# Patient Record
Sex: FEMALE | URBAN - METROPOLITAN AREA
[De-identification: names, ages, dates, MRNs, and addresses within clinical notes are randomized per-mention and may not be internally consistent; named-entity substitution may affect disease eponyms.]

---

## 2017-05-01 ENCOUNTER — HISTORICAL (OUTPATIENT)
Dept: ADMINISTRATIVE | Facility: HOSPITAL | Age: 62
End: 2017-05-01

## 2017-05-01 LAB — POC CREATININE: 0.6 MG/DL (ref 0.6–1.3)

## 2018-01-15 ENCOUNTER — HISTORICAL (OUTPATIENT)
Dept: LAB | Facility: HOSPITAL | Age: 63
End: 2018-01-15

## 2018-01-15 LAB
ALBUMIN SERPL-MCNC: 3.8 GM/DL (ref 3.4–5)
ALBUMIN/GLOB SERPL: 1.2 {RATIO}
ALP SERPL-CCNC: 100 UNIT/L (ref 38–126)
ALT SERPL-CCNC: 21 UNIT/L (ref 12–78)
AST SERPL-CCNC: 12 UNIT/L (ref 15–37)
BILIRUB SERPL-MCNC: 0.3 MG/DL (ref 0.2–1)
BILIRUBIN DIRECT+TOT PNL SERPL-MCNC: 0.1 MG/DL (ref 0–0.2)
BILIRUBIN DIRECT+TOT PNL SERPL-MCNC: 0.2 MG/DL (ref 0–0.8)
BUN SERPL-MCNC: 22 MG/DL (ref 7–18)
CALCIUM SERPL-MCNC: 8.7 MG/DL (ref 8.5–10.1)
CHLORIDE SERPL-SCNC: 107 MMOL/L (ref 98–107)
CO2 SERPL-SCNC: 22 MMOL/L (ref 21–32)
CREAT SERPL-MCNC: 0.71 MG/DL (ref 0.55–1.02)
DEPRECATED CALCIDIOL+CALCIFEROL SERPL-MC: 23.56 NG/ML (ref 30–80)
GLOBULIN SER-MCNC: 3.3 GM/DL (ref 2.4–3.5)
GLUCOSE SERPL-MCNC: 151 MG/DL (ref 74–106)
POTASSIUM SERPL-SCNC: 4.1 MMOL/L (ref 3.5–5.1)
PROT SERPL-MCNC: 7.1 GM/DL (ref 6.4–8.2)
SODIUM SERPL-SCNC: 142 MMOL/L (ref 136–145)

## 2018-02-27 ENCOUNTER — HISTORICAL (OUTPATIENT)
Dept: LAB | Facility: HOSPITAL | Age: 63
End: 2018-02-27

## 2018-02-27 LAB
BILIRUB SERPL-MCNC: NORMAL MG/DL
BLOOD URINE, POC: NORMAL
CLARITY, POC UA: NORMAL
COLOR, POC UA: NORMAL
GLUCOSE UR QL STRIP: NEGATIVE
KETONES UR QL STRIP: NEGATIVE
LEUKOCYTE EST, POC UA: NORMAL
NITRITE, POC UA: POSITIVE
PH, POC UA: 5
PROTEIN, POC: NORMAL
SPECIFIC GRAVITY, POC UA: 1.03
UROBILINOGEN, POC UA: NORMAL

## 2021-12-03 ENCOUNTER — HISTORICAL (OUTPATIENT)
Dept: ADMINISTRATIVE | Facility: HOSPITAL | Age: 66
End: 2021-12-03

## 2022-04-10 ENCOUNTER — HISTORICAL (OUTPATIENT)
Dept: ADMINISTRATIVE | Facility: HOSPITAL | Age: 67
End: 2022-04-10

## 2022-04-11 ENCOUNTER — HISTORICAL (OUTPATIENT)
Dept: ADMINISTRATIVE | Facility: HOSPITAL | Age: 67
End: 2022-04-11

## 2022-04-28 VITALS
HEIGHT: 66 IN | OXYGEN SATURATION: 98 % | SYSTOLIC BLOOD PRESSURE: 139 MMHG | BODY MASS INDEX: 38.19 KG/M2 | DIASTOLIC BLOOD PRESSURE: 84 MMHG | WEIGHT: 237.63 LBS

## 2022-04-28 VITALS
BODY MASS INDEX: 38.19 KG/M2 | SYSTOLIC BLOOD PRESSURE: 139 MMHG | DIASTOLIC BLOOD PRESSURE: 84 MMHG | OXYGEN SATURATION: 98 % | HEIGHT: 66 IN | WEIGHT: 237.63 LBS

## 2022-09-21 ENCOUNTER — HISTORICAL (OUTPATIENT)
Dept: ADMINISTRATIVE | Facility: HOSPITAL | Age: 67
End: 2022-09-21

## 2024-10-29 ENCOUNTER — HOSPITAL ENCOUNTER (INPATIENT)
Facility: HOSPITAL | Age: 69
LOS: 2 days | Discharge: HOME | DRG: 309 | End: 2024-10-31
Attending: STUDENT IN AN ORGANIZED HEALTH CARE EDUCATION/TRAINING PROGRAM | Admitting: INTERNAL MEDICINE
Payer: MEDICARE

## 2024-10-29 DIAGNOSIS — R07.9 CHEST PAIN: ICD-10-CM

## 2024-10-29 DIAGNOSIS — I48.91 ATRIAL FIBRILLATION, UNSPECIFIED TYPE: ICD-10-CM

## 2024-10-29 DIAGNOSIS — I48.91 A-FIB: ICD-10-CM

## 2024-10-29 DIAGNOSIS — R00.2 HEART PALPITATIONS: ICD-10-CM

## 2024-10-29 DIAGNOSIS — I48.91 ATRIAL FIBRILLATION WITH RVR: Primary | ICD-10-CM

## 2024-10-29 LAB
ALBUMIN SERPL-MCNC: 3.7 G/DL (ref 3.4–4.8)
ALBUMIN/GLOB SERPL: 1.2 RATIO (ref 1.1–2)
ALP SERPL-CCNC: 75 UNIT/L (ref 40–150)
ALT SERPL-CCNC: 23 UNIT/L (ref 0–55)
ANION GAP SERPL CALC-SCNC: 10 MEQ/L
APTT PPP: 27.8 SECONDS (ref 23.2–33.7)
AST SERPL-CCNC: 19 UNIT/L (ref 5–34)
BASOPHILS # BLD AUTO: 0.1 X10(3)/MCL
BASOPHILS NFR BLD AUTO: 1.2 %
BILIRUB SERPL-MCNC: 0.3 MG/DL
BUN SERPL-MCNC: 27.7 MG/DL (ref 9.8–20.1)
CALCIUM SERPL-MCNC: 9.2 MG/DL (ref 8.4–10.2)
CHLORIDE SERPL-SCNC: 107 MMOL/L (ref 98–107)
CO2 SERPL-SCNC: 24 MMOL/L (ref 23–31)
CREAT SERPL-MCNC: 1.18 MG/DL (ref 0.55–1.02)
CREAT/UREA NIT SERPL: 23
EOSINOPHIL # BLD AUTO: 0.27 X10(3)/MCL (ref 0–0.9)
EOSINOPHIL NFR BLD AUTO: 3.4 %
ERYTHROCYTE [DISTWIDTH] IN BLOOD BY AUTOMATED COUNT: 13.2 % (ref 11.5–17)
GFR SERPLBLD CREATININE-BSD FMLA CKD-EPI: 49 ML/MIN/1.73/M2
GLOBULIN SER-MCNC: 3.2 GM/DL (ref 2.4–3.5)
GLUCOSE SERPL-MCNC: 110 MG/DL (ref 82–115)
HCT VFR BLD AUTO: 43.6 % (ref 37–47)
HGB BLD-MCNC: 14.4 G/DL (ref 12–16)
IMM GRANULOCYTES # BLD AUTO: 0.02 X10(3)/MCL (ref 0–0.04)
IMM GRANULOCYTES NFR BLD AUTO: 0.2 %
INR PPP: 1
LYMPHOCYTES # BLD AUTO: 2.73 X10(3)/MCL (ref 0.6–4.6)
LYMPHOCYTES NFR BLD AUTO: 34 %
MCH RBC QN AUTO: 27.6 PG (ref 27–31)
MCHC RBC AUTO-ENTMCNC: 33 G/DL (ref 33–36)
MCV RBC AUTO: 83.7 FL (ref 80–94)
MONOCYTES # BLD AUTO: 0.58 X10(3)/MCL (ref 0.1–1.3)
MONOCYTES NFR BLD AUTO: 7.2 %
NEUTROPHILS # BLD AUTO: 4.32 X10(3)/MCL (ref 2.1–9.2)
NEUTROPHILS NFR BLD AUTO: 54 %
NRBC BLD AUTO-RTO: 0 %
PLATELET # BLD AUTO: 273 X10(3)/MCL (ref 130–400)
PMV BLD AUTO: 11.7 FL (ref 7.4–10.4)
POTASSIUM SERPL-SCNC: 3.6 MMOL/L (ref 3.5–5.1)
PROT SERPL-MCNC: 6.9 GM/DL (ref 5.8–7.6)
PROTHROMBIN TIME: 13 SECONDS (ref 12.5–14.5)
RBC # BLD AUTO: 5.21 X10(6)/MCL (ref 4.2–5.4)
SODIUM SERPL-SCNC: 141 MMOL/L (ref 136–145)
TROPONIN I SERPL-MCNC: <0.01 NG/ML (ref 0–0.04)
WBC # BLD AUTO: 8.02 X10(3)/MCL (ref 4.5–11.5)

## 2024-10-29 PROCEDURE — 84484 ASSAY OF TROPONIN QUANT: CPT | Performed by: INTERNAL MEDICINE

## 2024-10-29 PROCEDURE — 96361 HYDRATE IV INFUSION ADD-ON: CPT

## 2024-10-29 PROCEDURE — 85025 COMPLETE CBC W/AUTO DIFF WBC: CPT | Performed by: PHYSICIAN ASSISTANT

## 2024-10-29 PROCEDURE — 85610 PROTHROMBIN TIME: CPT | Performed by: PHYSICIAN ASSISTANT

## 2024-10-29 PROCEDURE — 63600175 PHARM REV CODE 636 W HCPCS: Performed by: NURSE PRACTITIONER

## 2024-10-29 PROCEDURE — 96365 THER/PROPH/DIAG IV INF INIT: CPT

## 2024-10-29 PROCEDURE — 93005 ELECTROCARDIOGRAM TRACING: CPT

## 2024-10-29 PROCEDURE — 25000003 PHARM REV CODE 250: Performed by: INTERNAL MEDICINE

## 2024-10-29 PROCEDURE — 93010 ELECTROCARDIOGRAM REPORT: CPT | Mod: ,,, | Performed by: INTERNAL MEDICINE

## 2024-10-29 PROCEDURE — 96375 TX/PRO/DX INJ NEW DRUG ADDON: CPT

## 2024-10-29 PROCEDURE — 25000003 PHARM REV CODE 250: Performed by: STUDENT IN AN ORGANIZED HEALTH CARE EDUCATION/TRAINING PROGRAM

## 2024-10-29 PROCEDURE — 25000003 PHARM REV CODE 250: Performed by: PHYSICIAN ASSISTANT

## 2024-10-29 PROCEDURE — 84484 ASSAY OF TROPONIN QUANT: CPT | Performed by: PHYSICIAN ASSISTANT

## 2024-10-29 PROCEDURE — 21400001 HC TELEMETRY ROOM

## 2024-10-29 PROCEDURE — 99285 EMERGENCY DEPT VISIT HI MDM: CPT | Mod: 25

## 2024-10-29 PROCEDURE — 11000001 HC ACUTE MED/SURG PRIVATE ROOM

## 2024-10-29 PROCEDURE — 85730 THROMBOPLASTIN TIME PARTIAL: CPT | Performed by: PHYSICIAN ASSISTANT

## 2024-10-29 PROCEDURE — 96366 THER/PROPH/DIAG IV INF ADDON: CPT

## 2024-10-29 PROCEDURE — 80053 COMPREHEN METABOLIC PANEL: CPT | Performed by: PHYSICIAN ASSISTANT

## 2024-10-29 PROCEDURE — 96372 THER/PROPH/DIAG INJ SC/IM: CPT | Performed by: NURSE PRACTITIONER

## 2024-10-29 PROCEDURE — 36415 COLL VENOUS BLD VENIPUNCTURE: CPT | Performed by: INTERNAL MEDICINE

## 2024-10-29 RX ORDER — TALC
6 POWDER (GRAM) TOPICAL NIGHTLY PRN
Status: DISCONTINUED | OUTPATIENT
Start: 2024-10-29 | End: 2024-10-31 | Stop reason: HOSPADM

## 2024-10-29 RX ORDER — DILTIAZEM HYDROCHLORIDE 5 MG/ML
10 INJECTION INTRAVENOUS
Status: COMPLETED | OUTPATIENT
Start: 2024-10-29 | End: 2024-10-29

## 2024-10-29 RX ORDER — ACETAMINOPHEN 325 MG/1
650 TABLET ORAL
Status: COMPLETED | OUTPATIENT
Start: 2024-10-29 | End: 2024-10-29

## 2024-10-29 RX ORDER — ENOXAPARIN SODIUM 150 MG/ML
1 INJECTION SUBCUTANEOUS EVERY 12 HOURS
Status: DISCONTINUED | OUTPATIENT
Start: 2024-10-29 | End: 2024-10-31 | Stop reason: HOSPADM

## 2024-10-29 RX ORDER — ACETAMINOPHEN 325 MG/1
650 TABLET ORAL EVERY 8 HOURS PRN
Status: DISCONTINUED | OUTPATIENT
Start: 2024-10-29 | End: 2024-10-31 | Stop reason: HOSPADM

## 2024-10-29 RX ORDER — SODIUM CHLORIDE 0.9 % (FLUSH) 0.9 %
10 SYRINGE (ML) INJECTION
Status: DISCONTINUED | OUTPATIENT
Start: 2024-10-29 | End: 2024-10-31 | Stop reason: HOSPADM

## 2024-10-29 RX ADMIN — DILTIAZEM HYDROCHLORIDE 2.5 MG/HR: 5 INJECTION INTRAVENOUS at 05:10

## 2024-10-29 RX ADMIN — ENOXAPARIN SODIUM 120 MG: 120 INJECTION SUBCUTANEOUS at 07:10

## 2024-10-29 RX ADMIN — DILTIAZEM HYDROCHLORIDE 10 MG: 5 INJECTION INTRAVENOUS at 05:10

## 2024-10-29 RX ADMIN — SODIUM CHLORIDE 1000 ML: 9 INJECTION, SOLUTION INTRAVENOUS at 04:10

## 2024-10-29 RX ADMIN — ACETAMINOPHEN 650 MG: 325 TABLET, FILM COATED ORAL at 08:10

## 2024-10-29 NOTE — ED PROVIDER NOTES
Encounter Date: 10/29/2024    SCRIBE #1 NOTE: I, Minerva Sosa, am scribing for, and in the presence of,  Sage Reid MD. I have scribed the following portions of the note - the EKG reading. Other sections scribed: HPI, ROS, PE.       History     Chief Complaint   Patient presents with    Palpitations     Pt ambulatory to triage and presents via POV. Presents from [facility] d/t palpitations and SOB that began last night.  EKG showed afib rvr w/ . States that she is currently experiencing SOB and chest tightness. Dr hi pt cardiologist. Hx afib with ablation over 20 yrs ago but has not had recurrence of afib since.      Patient is a 69 year old female with a reported history of atrial fibrillation and breast cancer that presents to the ED for chest pressure onset last night. She also complains of palpitations. She reports a short episode of palpitations of a couple of weeks ago. She denies syncope. She reports an ablation 20 years ago.     Cardiology: Margot Hi MD       The history is provided by the patient, medical records and a relative.     Review of patient's allergies indicates:   Allergen Reactions    Iodinated contrast media     Penicillin Rash     No past medical history on file.  No past surgical history on file.  No family history on file.     Review of Systems   Constitutional:  Negative for chills and fever.   HENT:  Negative for congestion, rhinorrhea and sore throat.    Eyes:  Negative for visual disturbance.   Respiratory:  Negative for cough and shortness of breath.    Cardiovascular:  Positive for chest pain and palpitations. Negative for leg swelling.   Gastrointestinal:  Negative for abdominal pain, nausea and vomiting.   Genitourinary:  Negative for dysuria, hematuria, vaginal bleeding and vaginal discharge.   Musculoskeletal:  Negative for joint swelling.   Skin:  Negative for rash.   Neurological:  Negative for weakness.   Psychiatric/Behavioral:  Negative for confusion.         Physical Exam     Initial Vitals [10/29/24 1612]   BP Pulse Resp Temp SpO2   (!) 152/82 (!) 118 20 98.4 °F (36.9 °C) 97 %      MAP       --         Physical Exam    Nursing note and vitals reviewed.  Constitutional: She is not diaphoretic. No distress.   HENT:   Head: Normocephalic and atraumatic.   Neck: Neck supple.   Normal range of motion.  Cardiovascular:    Tachycardia present.         No murmur heard.  Pulmonary/Chest: Breath sounds normal. No respiratory distress.   Abdominal: Abdomen is soft. She exhibits no distension. There is no abdominal tenderness.   Musculoskeletal:      Cervical back: Normal range of motion and neck supple.     Neurological: She is alert and oriented to person, place, and time. She has normal strength. No cranial nerve deficit or sensory deficit.   Skin: Skin is warm. Capillary refill takes less than 2 seconds.   Psychiatric: She has a normal mood and affect.         ED Course   Critical Care    Date/Time: 10/29/2024 4:48 PM    Performed by: Sage Reid IV, MD  Authorized by: Sage Reid IV, MD  Direct patient critical care time: 8 minutes  Additional history critical care time: 5 minutes  Ordering / reviewing critical care time: 6 minutes  Documentation critical care time: 5 minutes  Consulting other physicians critical care time: 6 minutes  Consult with family critical care time: 5 minutes  Total critical care time (exclusive of procedural time) : 35 minutes  Critical care time was exclusive of separately billable procedures and treating other patients and teaching time.  Critical care was time spent personally by me on the following activities: development of treatment plan with patient or surrogate, discussions with consultants, discussions with primary provider, interpretation of cardiac output measurements, examination of patient, evaluation of patient's response to treatment, obtaining history from patient or surrogate, ordering and performing treatments and  interventions, re-evaluation of patient's condition, review of old charts, pulse oximetry, ordering and review of radiographic studies and ordering and review of laboratory studies.        Labs Reviewed   COMPREHENSIVE METABOLIC PANEL - Abnormal       Result Value    Sodium 141      Potassium 3.6      Chloride 107      CO2 24      Glucose 110      Blood Urea Nitrogen 27.7 (*)     Creatinine 1.18 (*)     Calcium 9.2      Protein Total 6.9      Albumin 3.7      Globulin 3.2      Albumin/Globulin Ratio 1.2      Bilirubin Total 0.3      ALP 75      ALT 23      AST 19      eGFR 49      Anion Gap 10.0      BUN/Creatinine Ratio 23     CBC WITH DIFFERENTIAL - Abnormal    WBC 8.02      RBC 5.21      Hgb 14.4      Hct 43.6      MCV 83.7      MCH 27.6      MCHC 33.0      RDW 13.2      Platelet 273      MPV 11.7 (*)     Neut % 54.0      Lymph % 34.0      Mono % 7.2      Eos % 3.4      Basophil % 1.2      Lymph # 2.73      Neut # 4.32      Mono # 0.58      Eos # 0.27      Baso # 0.10      IG# 0.02      IG% 0.2      NRBC% 0.0     APTT - Normal    PTT 27.8     PROTIME-INR - Normal    PT 13.0      INR 1.0     TROPONIN I - Normal    Troponin-I <0.010     CBC W/ AUTO DIFFERENTIAL    Narrative:     The following orders were created for panel order CBC auto differential.  Procedure                               Abnormality         Status                     ---------                               -----------         ------                     CBC with Differential[[id number]]       Abnormal            Final result                 Please view results for these tests on the individual orders.     EKG Readings: (Independently Interpreted)   Initial Reading: No STEMI. Rhythm: Atrial Fibrillation. Heart Rate: 124. Ectopy: Rare PVCs. Conduction: Normal. ST Segments: Normal ST Segments. T Waves: Normal. Axis: Normal. Clinical Impression: Atrial Fibrillation with RVR   1613       Imaging Results              CT Chest Without Contrast  (Preliminary result)  Result time 10/29/24 21:06:44      Preliminary result by Home Pizarro Jr., MD (10/29/24 21:06:44)                   Narrative:    START OF REPORT:  Technique: CT Scan of the chest was performed without intravenous contrast with direct axial as well as sagittal and, coronal, reconstruction images.    Dosage Information: Automated Exposure Control was utilized 204.27 mGy.cm.    Comparison: None.    Clinical History: A fib.    Findings:  Soft Tissues: Unremarkable.  Lines and Tubes: None.  Axilla: A few surgical clips are seen in the right axilla.  Neck: The right thyroid lobe is enlarged by hypodense lesions, some with peripheral calcification.  Mediastinum: A small hiatal hernia is noted.  Heart: Mild cardiomegaly is seen. Mild coronary artery calcification is seen.  Aorta: Mild aortic calcification is seen in the thoracic aorta.  Lungs: Moderate streaky opacity is seen in the lingula consistent with scarring and or subsegmental atelectasis. No acute focal infiltrate or consolidation is seen.  Pleura: No effusions or pneumothorax are identified.  Bony Structures:  Spine: Moderate spondylolytic changes are seen in the thoracic spine.  Ribs: The ribs appear unremarkable.  Abdomen: The visualized upper abdominal organs appear unremarkable.      Impression:  1. No acute focal infiltrate or consolidation is seen.  2. No acute intrathoracic process identified. Details and other findings as discussed above.                                         X-Ray Chest 1 View (Final result)  Result time 10/29/24 17:15:54      Final result by Manish Naranjo MD (10/29/24 17:15:54)                   Impression:      Right hilar enlargement.  This may be overlapping vasculature, but lymphadenopathy is an additional consideration.  Chest CT can be pursued for further assessment.      Electronically signed by: Manish Naranjo  Date:    10/29/2024  Time:    17:15               Narrative:    EXAMINATION:  XR  CHEST 1 VIEW    CLINICAL HISTORY:  Palpitations    TECHNIQUE:  Frontal view(s) of the chest.    COMPARISON:  No relevant comparison studies available at the time of dictation.    FINDINGS:  Normal cardiac silhouette.  The lungs are mildly hypoinflated.  Asymmetric right hilar enlargement.  No significant pleural effusion or discernible pneumothorax.                                       Medications   diltiaZEM 125 mg in dextrose 5 % 125 mL IVPB (ready to mix) (titrating) (2.5 mg/hr Intravenous New Bag 10/29/24 1713)   enoxaparin injection 120 mg (120 mg Subcutaneous Given 10/29/24 1900)   sodium chloride 0.9% flush 10 mL (has no administration in time range)   melatonin tablet 6 mg (has no administration in time range)   acetaminophen tablet 650 mg (has no administration in time range)   sodium chloride 0.9% bolus 1,000 mL 1,000 mL (0 mLs Intravenous Stopped 10/29/24 1723)   diltiaZEM injection 10 mg (10 mg Intravenous Given 10/29/24 1712)   acetaminophen tablet 650 mg (650 mg Oral Given 10/29/24 2035)     Medical Decision Making  69-year-old presenting with AFib RVR had an ablation 20 years ago in his not in any issues since  In AFib RVR on arrival here  Controlled with diltiazem bolus and drip will Lovenox discussed with [facility] will admit to hospitalist    Differential diagnosis includes, but is not limited to:  Afib rvr, electrolyte derangementm, dehydration, kacey, acs, chf     Problems Addressed:  Atrial fibrillation with RVR: acute illness or injury that poses a threat to life or bodily functions    Amount and/or Complexity of Data Reviewed  Labs: ordered.  Radiology: ordered and independent interpretation performed.     Details: CXR - no obvious infiltrates, consolidations, pleural effusions, or pneumothorax.      ECG/medicine tests: ordered and independent interpretation performed.     Details: 1613. Initial Reading: No STEMI. Rhythm: Atrial Fibrillation. Heart Rate: 124. Ectopy: Rare PVCs. Conduction: Normal. ST  Segments: Normal ST Segments. T Waves: Normal. Axis: Normal. Clinical Impression: Atrial Fibrillation with RVR   Discussion of management or test interpretation with external provider(s): Hospitalist - will admit  [facility] - will consult     Risk  Prescription drug management.  Drug therapy requiring intensive monitoring for toxicity.  Decision regarding hospitalization.    Critical Care  Total time providing critical care: 35 minutes            Scribe Attestation:   Scribe #1: I performed the above scribed service and the documentation accurately describes the services I performed. I attest to the accuracy of the note.    Attending Attestation:           Physician Attestation for Scribe:  Physician Attestation Statement for Scribe #1: I, Sage Reid MD, reviewed documentation, as scribed by Minerva Swan in my presence, and it is both accurate and complete.             ED Course as of 10/29/24 2338   Tue Oct 29, 2024   1716 Paged cardiology [ED]   1755 [facility] - will consult, hospitalist admission    [AC]   1847 [facility] hospitalist will admit     [AC]      ED Course User Index  [AC] Sage Reid IV, MD  [ED] Minerva Swan                           Clinical Impression:  Final diagnoses:  [R00.2] Heart palpitations  [I48.91] Atrial fibrillation, unspecified type (Primary)  [I48.91] Atrial fibrillation with RVR          ED Disposition Condition    Admit Stable                Sage Reid IV, MD  10/29/24 1561

## 2024-10-29 NOTE — FIRST PROVIDER EVALUATION
"Medical screening examination initiated.  I have conducted a focused provider triage encounter, findings are as follows:    Brief history of present illness:  69 year female to ED heart palpitations patient ablation past presents to ED for evaluation heart palpitations.  Patient seen at walk-in clinic with AFib RVR and sent to ED for further evaluation She follows with [facility] Dr. Hi    Vitals:    10/29/24 1612   BP: (!) 152/82   Pulse: (!) 118   Resp: 20   Temp: 98.4 °F (36.9 °C)   SpO2: 97%   Weight: 113.4 kg (250 lb)   Height: 5' 6" (1.676 m)       Pertinent physical exam:  Patient is awake and alert and oriented.  Ambulatory to triage.  In no acute distress.       Brief workup plan:  labs, EKG, CXR    Preliminary workup initiated; this workup will be continued and followed by the physician or advanced practice provider that is assigned to the patient when roomed.  "

## 2024-10-30 ENCOUNTER — ANESTHESIA (OUTPATIENT)
Dept: CARDIOLOGY | Facility: HOSPITAL | Age: 69
End: 2024-10-30
Payer: MEDICARE

## 2024-10-30 ENCOUNTER — ANESTHESIA EVENT (OUTPATIENT)
Dept: CARDIOLOGY | Facility: HOSPITAL | Age: 69
End: 2024-10-30
Payer: MEDICARE

## 2024-10-30 LAB
ALBUMIN SERPL-MCNC: 3.4 G/DL (ref 3.4–4.8)
ALBUMIN/GLOB SERPL: 1.4 RATIO (ref 1.1–2)
ALP SERPL-CCNC: 66 UNIT/L (ref 40–150)
ALT SERPL-CCNC: 20 UNIT/L (ref 0–55)
ANION GAP SERPL CALC-SCNC: 9 MEQ/L
AST SERPL-CCNC: 16 UNIT/L (ref 5–34)
BASOPHILS # BLD AUTO: 0.09 X10(3)/MCL
BASOPHILS NFR BLD AUTO: 1.6 %
BILIRUB SERPL-MCNC: 0.4 MG/DL
BUN SERPL-MCNC: 22.1 MG/DL (ref 9.8–20.1)
CALCIUM SERPL-MCNC: 8.9 MG/DL (ref 8.4–10.2)
CHLORIDE SERPL-SCNC: 105 MMOL/L (ref 98–107)
CO2 SERPL-SCNC: 23 MMOL/L (ref 23–31)
CREAT SERPL-MCNC: 0.94 MG/DL (ref 0.55–1.02)
CREAT/UREA NIT SERPL: 24
EOSINOPHIL # BLD AUTO: 0.19 X10(3)/MCL (ref 0–0.9)
EOSINOPHIL NFR BLD AUTO: 3.4 %
ERYTHROCYTE [DISTWIDTH] IN BLOOD BY AUTOMATED COUNT: 13.3 % (ref 11.5–17)
GFR SERPLBLD CREATININE-BSD FMLA CKD-EPI: >60 ML/MIN/1.73/M2
GLOBULIN SER-MCNC: 2.4 GM/DL (ref 2.4–3.5)
GLUCOSE SERPL-MCNC: 110 MG/DL (ref 82–115)
HCT VFR BLD AUTO: 41 % (ref 37–47)
HGB BLD-MCNC: 13.4 G/DL (ref 12–16)
IMM GRANULOCYTES # BLD AUTO: 0.01 X10(3)/MCL (ref 0–0.04)
IMM GRANULOCYTES NFR BLD AUTO: 0.2 %
LYMPHOCYTES # BLD AUTO: 2.46 X10(3)/MCL (ref 0.6–4.6)
LYMPHOCYTES NFR BLD AUTO: 44.2 %
MAGNESIUM SERPL-MCNC: 1.8 MG/DL (ref 1.6–2.6)
MCH RBC QN AUTO: 27.2 PG (ref 27–31)
MCHC RBC AUTO-ENTMCNC: 32.7 G/DL (ref 33–36)
MCV RBC AUTO: 83.3 FL (ref 80–94)
MONOCYTES # BLD AUTO: 0.44 X10(3)/MCL (ref 0.1–1.3)
MONOCYTES NFR BLD AUTO: 7.9 %
NEUTROPHILS # BLD AUTO: 2.38 X10(3)/MCL (ref 2.1–9.2)
NEUTROPHILS NFR BLD AUTO: 42.7 %
NRBC BLD AUTO-RTO: 0 %
PHOSPHATE SERPL-MCNC: 3.8 MG/DL (ref 2.3–4.7)
PLATELET # BLD AUTO: 246 X10(3)/MCL (ref 130–400)
PMV BLD AUTO: 12.1 FL (ref 7.4–10.4)
POTASSIUM SERPL-SCNC: 4 MMOL/L (ref 3.5–5.1)
PROT SERPL-MCNC: 5.8 GM/DL (ref 5.8–7.6)
RBC # BLD AUTO: 4.92 X10(6)/MCL (ref 4.2–5.4)
SODIUM SERPL-SCNC: 137 MMOL/L (ref 136–145)
TROPONIN I SERPL-MCNC: <0.01 NG/ML (ref 0–0.04)
TROPONIN I SERPL-MCNC: <0.01 NG/ML (ref 0–0.04)
WBC # BLD AUTO: 5.57 X10(3)/MCL (ref 4.5–11.5)

## 2024-10-30 PROCEDURE — 63600175 PHARM REV CODE 636 W HCPCS: Performed by: INTERNAL MEDICINE

## 2024-10-30 PROCEDURE — 84484 ASSAY OF TROPONIN QUANT: CPT | Performed by: INTERNAL MEDICINE

## 2024-10-30 PROCEDURE — 84100 ASSAY OF PHOSPHORUS: CPT | Performed by: INTERNAL MEDICINE

## 2024-10-30 PROCEDURE — 5A2204Z RESTORATION OF CARDIAC RHYTHM, SINGLE: ICD-10-PCS | Performed by: INTERNAL MEDICINE

## 2024-10-30 PROCEDURE — 93005 ELECTROCARDIOGRAM TRACING: CPT

## 2024-10-30 PROCEDURE — 63600175 PHARM REV CODE 636 W HCPCS: Performed by: NURSE PRACTITIONER

## 2024-10-30 PROCEDURE — 21400001 HC TELEMETRY ROOM

## 2024-10-30 PROCEDURE — 63600175 PHARM REV CODE 636 W HCPCS: Performed by: NURSE ANESTHETIST, CERTIFIED REGISTERED

## 2024-10-30 PROCEDURE — 25000003 PHARM REV CODE 250: Performed by: NURSE PRACTITIONER

## 2024-10-30 PROCEDURE — 36415 COLL VENOUS BLD VENIPUNCTURE: CPT | Performed by: NURSE PRACTITIONER

## 2024-10-30 PROCEDURE — 80053 COMPREHEN METABOLIC PANEL: CPT | Performed by: NURSE PRACTITIONER

## 2024-10-30 PROCEDURE — 25000003 PHARM REV CODE 250: Performed by: INTERNAL MEDICINE

## 2024-10-30 PROCEDURE — 93010 ELECTROCARDIOGRAM REPORT: CPT | Mod: ,,, | Performed by: INTERNAL MEDICINE

## 2024-10-30 PROCEDURE — 83735 ASSAY OF MAGNESIUM: CPT | Performed by: INTERNAL MEDICINE

## 2024-10-30 PROCEDURE — 85025 COMPLETE CBC W/AUTO DIFF WBC: CPT | Performed by: NURSE PRACTITIONER

## 2024-10-30 RX ORDER — LISINOPRIL AND HYDROCHLOROTHIAZIDE 12.5; 2 MG/1; MG/1
1 TABLET ORAL DAILY
Status: ON HOLD | COMMUNITY
Start: 2024-09-06 | End: 2024-10-31 | Stop reason: HOSPADM

## 2024-10-30 RX ORDER — VERAPAMIL HCL 240 MG
240 TABLET, EXTENDED RELEASE ORAL DAILY
Status: ON HOLD | COMMUNITY
Start: 2024-10-06 | End: 2024-10-31 | Stop reason: HOSPADM

## 2024-10-30 RX ORDER — PROCHLORPERAZINE EDISYLATE 5 MG/ML
2.5 INJECTION INTRAMUSCULAR; INTRAVENOUS EVERY 4 HOURS PRN
Status: DISCONTINUED | OUTPATIENT
Start: 2024-10-30 | End: 2024-10-31 | Stop reason: HOSPADM

## 2024-10-30 RX ORDER — ALPRAZOLAM 0.5 MG/1
0.5 TABLET ORAL NIGHTLY PRN
COMMUNITY

## 2024-10-30 RX ORDER — ONDANSETRON HYDROCHLORIDE 2 MG/ML
4 INJECTION, SOLUTION INTRAVENOUS EVERY 4 HOURS PRN
Status: DISCONTINUED | OUTPATIENT
Start: 2024-10-30 | End: 2024-10-31 | Stop reason: HOSPADM

## 2024-10-30 RX ORDER — DILTIAZEM HYDROCHLORIDE 120 MG/1
120 CAPSULE, COATED, EXTENDED RELEASE ORAL DAILY
Status: DISCONTINUED | OUTPATIENT
Start: 2024-10-30 | End: 2024-10-31 | Stop reason: HOSPADM

## 2024-10-30 RX ORDER — LIDOCAINE HYDROCHLORIDE 20 MG/ML
INJECTION, SOLUTION EPIDURAL; INFILTRATION; INTRACAUDAL; PERINEURAL
Status: DISCONTINUED | OUTPATIENT
Start: 2024-10-30 | End: 2024-10-30

## 2024-10-30 RX ORDER — PROPOFOL 10 MG/ML
VIAL (ML) INTRAVENOUS CONTINUOUS PRN
Status: DISCONTINUED | OUTPATIENT
Start: 2024-10-30 | End: 2024-10-30

## 2024-10-30 RX ADMIN — ENOXAPARIN SODIUM 120 MG: 120 INJECTION SUBCUTANEOUS at 08:10

## 2024-10-30 RX ADMIN — ENOXAPARIN SODIUM 120 MG: 120 INJECTION SUBCUTANEOUS at 09:10

## 2024-10-30 RX ADMIN — DILTIAZEM HYDROCHLORIDE 120 MG: 120 CAPSULE, COATED, EXTENDED RELEASE ORAL at 11:10

## 2024-10-30 RX ADMIN — LIDOCAINE HYDROCHLORIDE 4 ML: 20 INJECTION, SOLUTION EPIDURAL; INFILTRATION; INTRACAUDAL; PERINEURAL at 09:10

## 2024-10-30 RX ADMIN — PROPOFOL 100 MCG/KG/MIN: 10 INJECTION, EMULSION INTRAVENOUS at 09:10

## 2024-10-30 RX ADMIN — ACETAMINOPHEN 650 MG: 325 TABLET, FILM COATED ORAL at 12:10

## 2024-10-30 RX ADMIN — ONDANSETRON 4 MG: 2 INJECTION INTRAMUSCULAR; INTRAVENOUS at 02:10

## 2024-10-30 NOTE — PROGRESS NOTES
[facility] Hospital Medicine Progress Note        Chief Complaint: Inpatient Follow-up     HPI:   69-year-old female with a past medical history of AFib over 10 years ago which was treated with ablation, breast cancers status post lumpectomies/XRT, in remission on Femara and additional past medical history as below presented to the ER complaining of chest palpitations over the last 24 hours with mild dyspnea.  She denied fever or chills.     She arrived to the ER afebrile mildly tachycardic in the 110s hemodynamically stable maintaining normal sats on room air.  Chest x-ray questioned nonspecific findings and therefore a CT of the thorax without contrast was obtained and showed no acute process.  Patient was placed on a diltiazem drip and Hospitalist was consulted for admission with consultation to Cardiology.    Interval Hx:  Resting comfortably in bed.  Heart rate is better controlled.  Currently in the 60s.  No chest pain or palpitations.  Remains in AFib on telemetry    Objective/physical exam:  GENERAL: awake, alert, oriented and in no acute distress, non-toxic appearing   HEENT: normocephalic atraumatic   NECK: supple   LUNGS: Clear bilaterally, no wheezing or rales, no accessory muscle use   CVS:  Irregular rate and rhythm, normal peripheral perfusion  ABD: Soft, non-tender, non-distended, bowel sounds present  EXTREMITIES: no clubbing or cyanosis  SKIN: Warm, dry.   NEURO: alert and oriented, grossly without focal deficits   PSYCHIATRIC: Cooperative    VITAL SIGNS: 24 HRS MIN & MAX LAST   Temp  Min: 98.1 °F (36.7 °C)  Max: 98.4 °F (36.9 °C) 98.2 °F (36.8 °C)   BP  Min: 103/66  Max: 159/113 127/72   Pulse  Min: 67  Max: 118  67   Resp  Min: 16  Max: 30 19   SpO2  Min: 96 %  Max: 100 % 97 %       Recent Labs   Lab 10/29/24  1628   WBC 8.02   RBC 5.21   HGB 14.4   HCT 43.6   MCV 83.7   MCH 27.6   MCHC 33.0   RDW 13.2      MPV 11.7*       Recent Labs   Lab 10/29/24  4428       K 3.6      CO2 24   BUN 27.7*   CREATININE 1.18*   CALCIUM 9.2   ALBUMIN 3.7   ALKPHOS 75   ALT 23   AST 19   BILITOT 0.3          Microbiology Results (last 7 days)       ** No results found for the last 168 hours. **             Radiology:  CT Chest Without Contrast  START OF REPORT:  Technique: CT Scan of the chest was performed without intravenous contrast with direct axial as well as sagittal and, coronal, reconstruction images.    Dosage Information: Automated Exposure Control was utilized 204.27 mGy.cm.    Comparison: None.    Clinical History: A fib.    Findings:  Soft Tissues: Unremarkable.  Lines and Tubes: None.  Axilla: A few surgical clips are seen in the right axilla.  Neck: The right thyroid lobe is enlarged by hypodense lesions, some with peripheral calcification.  Mediastinum: A small hiatal hernia is noted.  Heart: Mild cardiomegaly is seen. Mild coronary artery calcification is seen.  Aorta: Mild aortic calcification is seen in the thoracic aorta.  Lungs: Moderate streaky opacity is seen in the lingula consistent with scarring and or subsegmental atelectasis. No acute focal infiltrate or consolidation is seen.  Pleura: No effusions or pneumothorax are identified.  Bony Structures:  Spine: Moderate spondylolytic changes are seen in the thoracic spine.  Ribs: The ribs appear unremarkable.  Abdomen: The visualized upper abdominal organs appear unremarkable.    Impression:  1. No acute focal infiltrate or consolidation is seen.  2. No acute intrathoracic process identified. Details and other findings as discussed above.  X-Ray Chest 1 View  Narrative: EXAMINATION:  XR CHEST 1 VIEW    CLINICAL HISTORY:  Palpitations    TECHNIQUE:  Frontal view(s) of the chest.    COMPARISON:  No relevant comparison studies available at the time of dictation.    FINDINGS:  Normal cardiac silhouette.  The lungs are mildly hypoinflated.  Asymmetric right hilar enlargement.  No significant pleural effusion  or discernible pneumothorax.  Impression: Right hilar enlargement.  This may be overlapping vasculature, but lymphadenopathy is an additional consideration.  Chest CT can be pursued for further assessment.    Electronically signed by: Manish Naranjo  Date:    10/29/2024  Time:    17:15        Medications:  Scheduled Meds:   enoxparin  1 mg/kg Subcutaneous Q12H (treatment, non-standard time)     Continuous Infusions:   dilTIAZem  0-15 mg/hr Intravenous Continuous 2.5 mL/hr at 10/29/24 1713 2.5 mg/hr at 10/29/24 1713     PRN Meds:.  Current Facility-Administered Medications:     acetaminophen, 650 mg, Oral, Q8H PRN    melatonin, 6 mg, Oral, Nightly PRN    ondansetron, 4 mg, Intravenous, Q4H PRN    prochlorperazine, 2.5 mg, Intravenous, Q4H PRN    sodium chloride 0.9%, 10 mL, Intravenous, PRN        Assessment/Plan:   AFib with RVR  Previous AFib status post ablation      Hx:  Kidney cancer/resection, breast cancer/lumpectomy, HTN     Continue Cardizem drip.  Cardiology has been consulted will follow up the recommendations   Electrolytes stable.    Patient was had previous history of ablation.    Continue treatment dose Lovenox for now.  She was did not have anticoagulation list on home medications.    Holding verapamil and BP meds for now    Critical care diagnosis:  Arrhythmia requiring Cardizem drip  Critical care interventions: hands on evaluation, review of labs/radiographs/records and discussions with family  Critical care time spent: >32 minutes    71 minutes + prolonged care time spent = 31 minutes.  The total time spent = 102 minutes    Afternoon update:  Discussed case with Dr. Hi.  Successful cardioversion this morning.  Cardizem gtt discontinued and switched to oral.  Plan for DC tomorrow morning if remains stable.       Cedrick Flanagan MD   10/30/2024     All diagnosis and differential diagnosis have been reviewed; assessment and plan has been documented; I have personally reviewed the labs and test  results that are presently available; I have reviewed the patients medication list; I have reviewed the consulting providers response and recommendations. I have reviewed or attempted to review medical records based upon their availability    All of the patient's questions have been  addressed and answered. Patient's is agreeable to the above stated plan. I will continue to monitor closely and make adjustments to medical management as needed.  _____________________________________________________________________

## 2024-10-30 NOTE — CONSULTS
Inpatient consult to Cardiology  Consult performed by: Kayla El FNP  Consult ordered by: Rebecca Smith, AGACNP-BC  Reason for consult: Atrial Fibrillation with RVR      [facility] - 4th Floor Medical Telemetry    Cardiology  Consult Note    Patient Name: Alesha Maradiaga  MRN: [id number]  Admission Date: 10/29/2024  Hospital Length of Stay: 1 days  Code Status: Full Code   Attending Provider: Cedrick Flanagan MD   Consulting Provider: VICKIE Cruz  Primary Care Physician: No primary care provider on file.  Principal Problem:Atrial fibrillation    Patient information was obtained from patient, past medical records, and ER records.     Subjective:     Chief Complaint/Reason for Consult: Atrial Fibrillation with RVR    HPI:  Patient is a 69 y.o. patient known to [facility], Dr. Hi, with a PMH of PAF s/p Ablation > 10 years ago, HTN, Breast CA, Nephrectomy, and PSVT who presented [facility] ER on 10.29.24 with c/o palpitations over the last 24 hours associated with Mild Dyspnea.  She denied CP, N/V, fever, or diaphoresis.  Upon arrival to ER her HR was noted to be in the 110s and EKG confirmed Atrial Fibrillation with RVR.  /82 and O2 sats 97% on RA.  Laboratory values significant for BUN/Cr. 1.18 otherwise unremarkable. CXR without acute pulmonary processes.  She was given 10 mg IV Cardizem push and placed on continuous gtt at 2.5 mg/Hr with improvement in HR.  She was admitted to  and [facility] was consulted for evaluation and treatment of her Afib RVR.       PMH: PAF sp Ablation > 10 years ago, HTN, PSVT, Breast CA  PSH: Nephrectomy, Cardiac Ablation, Left Breast Lumpectomy, Tonsillectomy, Appendectomy  Family History: Mother, Brother: HTN, Type II DM.  Sister: CVA, HTN, Type II DM  Social History: Former Smoker (Quit 1975)    Previous Cardiac Diagnostics:   TTE (8.9.2023)  1. The study quality is average.   2. The left ventricle is normal in size. Global left ventricular systolic function is  normal. The left ventricular ejection fraction is 65%. The left ventricle diastolic function is impaired (Grade I) with normal left atrial pressure. Concentric left ventricular hypertrophy is present. It is mild.  3. The left atrial diameter is mildly increased.   4. Mild (1+) tricuspid regurgitation. Trace mitral regurgitation.  5. The estimated pulmonary artery systolic pressure is 37 mmHg assuming a right atrial pressure of 3 mmHg.     PET (1.27.2020)   This is an abnormal perfusion study. Study is consistent with ischemia.    Small reversible perfusion abnormality of mild intensity in the anterior region.    Perfusion imaging is suggestive of single vessel disease. Perfusion defect is in the distribution of left anterior descending artery.    This scan is suggestive of low to moderate risk for future cardiovascular events.    The left ventricular cavity is noted to be mildly enlarged on the stress studies. The stress left ventricular ejection fraction was calculated to be 69% and left ventricular global function is normal. The rest left ventricular cavity is noted to be normal. The rest left ventricular ejection fraction was calculated to be 58% and rest left ventricular global function is normal.    The study quality is average    Cardiac Calcium Score (1.20.2020)--> 2      No past medical history on file.  No past surgical history on file.  Review of patient's allergies indicates:   Allergen Reactions    Iodinated contrast media     Penicillin Rash     No current facility-administered medications on file prior to encounter.     Current Outpatient Medications on File Prior to Encounter   Medication Sig    ALPRAZolam (XANAX) 0.5 MG tablet Take 0.5 mg by mouth nightly as needed for Insomnia or Anxiety.    lisinopriL-hydrochlorothiazide (PRINZIDE,ZESTORETIC) 20-12.5 mg per tablet Take 1 tablet by mouth once daily. 1 tablet daily and one tablet every evening PRN    verapamiL (CALAN-SR) 240 MG CR tablet Take 240 mg  by mouth once daily.     Family History    None       Tobacco Use    Smoking status: Not on file    Smokeless tobacco: Not on file   Substance and Sexual Activity    Alcohol use: Not on file    Drug use: Not on file    Sexual activity: Not on file       Review of Systems   Constitutional:  Negative for appetite change and fatigue.   Respiratory:  Negative for chest tightness and shortness of breath.    Cardiovascular:  Positive for palpitations. Negative for chest pain and leg swelling.   All other systems reviewed and are negative.    Objective:     Vital Signs (Most Recent):  Temp: 98.2 °F (36.8 °C) (10/30/24 1124)  Pulse: 74 (10/30/24 1124)  Resp: 19 (10/30/24 0314)  BP: 119/74 (10/30/24 1124)  SpO2: 95 % (10/30/24 1124) Vital Signs (24h Range):  Temp:  [97.6 °F (36.4 °C)-98.4 °F (36.9 °C)] 98.2 °F (36.8 °C)  Pulse:  [] 74  Resp:  [16-30] 19  SpO2:  [95 %-100 %] 95 %  BP: (103-159)/() 119/74   Weight: 115.9 kg (255 lb 8.2 oz)  Body mass index is 41.24 kg/m².  SpO2: 95 %       Intake/Output Summary (Last 24 hours) at 10/30/2024 1223  Last data filed at 10/30/2024 0515  Gross per 24 hour   Intake 1249.86 ml   Output 500 ml   Net 749.86 ml     Lines/Drains/Airways       Peripheral Intravenous Line  Duration                  Peripheral IV - Single Lumen 10/29/24 1622 20 G Left Antecubital <1 day                  Significant Labs:   Chemistries:   Recent Labs   Lab 10/29/24  1628 10/29/24  2323 10/30/24  0712     --  137   K 3.6  --  4.0     --  105   CO2 24  --  23   BUN 27.7*  --  22.1*   CREATININE 1.18*  --  0.94   CALCIUM 9.2  --  8.9   BILITOT 0.3  --  0.4   ALKPHOS 75  --  66   ALT 23  --  20   AST 19  --  16   GLUCOSE 110  --  110   MG  --   --  1.80   PHOS  --   --  3.8   TROPONINI <0.010 <0.010 <0.010        CBC/Anemia Labs: Coags:    Recent Labs   Lab 10/29/24  1628 10/30/24  0713   WBC 8.02 5.57   HGB 14.4 13.4   HCT 43.6 41.0    246   MCV 83.7 83.3   RDW 13.2 13.3    Recent  Labs   Lab 10/29/24  1628   INR 1.0   APTT 27.8        Significant Imaging:  Imaging Results              CT Chest Without Contrast (Final result)  Result time 10/30/24 10:38:17      Final result by Galilea Linares MD (10/30/24 10:38:17)                   Impression:      No appreciable acute cardiopulmonary abnormality    Suspected area of fat necrosis at the upper inner right breast.  Correlate with clinical exam and 01/22/2024 screening mammogram.    No significant discrepancy from preliminary report.      Electronically signed by: Galilea Linares  Date:    10/30/2024  Time:    10:38               Narrative:    EXAMINATION:  CT CHEST WITHOUT CONTRAST    CLINICAL HISTORY:  a fib;    TECHNIQUE:  Helically acquired axial images, sagittal and coronal reformations were obtained from the thoracic inlet to the lung bases without the IV administration of contrast.    Automated tube current modulation, weight-based exposure dosing, and/or iterative reconstruction technique utilized to reach lowest reasonably achievable exposure rate.    DLP: 204 mGy*cm    COMPARISON:  PET-CT 01/31/2012    FINDINGS:  BASE OF NECK: Goiter    AORTA: Mild aortic atherosclerosis.    HEART: There are calcifications in the region the mitral valve annulus.    Cardiomegaly.    KRISTAL/MEDIASTINUM: No enlarged lymph nodes by size criteria.  Evaluation of hilar lymphadenopathy is limited without intravenous contrast.    AIRWAYS: Patent.    LUNGS: Clear lungs.    PLEURA: No pleural effusion or pneumothorax.    UPPER ABDOMEN: Liver is hypodense compatible with steatosis.    THORACIC SOFT TISSUES: Lobule of fat outlined by soft tissue density at the upper inner right breast measuring approximately 2.9 cm.    BONES: No acute fracture. No suspicious lytic or sclerotic lesions.                        Preliminary result by Home Pizarro Jr., MD (10/29/24 21:06:44)                   Impression:    1. No acute focal infiltrate or consolidation is  seen.  2. No acute intrathoracic process identified. Details and other findings as discussed above.               Narrative:    START OF REPORT:  Technique: CT Scan of the chest was performed without intravenous contrast with direct axial as well as sagittal and, coronal, reconstruction images.    Dosage Information: Automated Exposure Control was utilized 204.27 mGy.cm.    Comparison: None.    Clinical History: A fib.    Findings:  Soft Tissues: Unremarkable.  Lines and Tubes: None.  Axilla: A few surgical clips are seen in the right axilla.  Neck: The right thyroid lobe is enlarged by hypodense lesions, some with peripheral calcification.  Mediastinum: A small hiatal hernia is noted.  Heart: Mild cardiomegaly is seen. Mild coronary artery calcification is seen.  Aorta: Mild aortic calcification is seen in the thoracic aorta.  Lungs: Moderate streaky opacity is seen in the lingula consistent with scarring and or subsegmental atelectasis. No acute focal infiltrate or consolidation is seen.  Pleura: No effusions or pneumothorax are identified.  Bony Structures:  Spine: Moderate spondylolytic changes are seen in the thoracic spine.  Ribs: The ribs appear unremarkable.  Abdomen: The visualized upper abdominal organs appear unremarkable.                                         X-Ray Chest 1 View (Final result)  Result time 10/29/24 17:15:54      Final result by Manish Naranjo MD (10/29/24 17:15:54)                   Impression:      Right hilar enlargement.  This may be overlapping vasculature, but lymphadenopathy is an additional consideration.  Chest CT can be pursued for further assessment.      Electronically signed by: Manish Naranjo  Date:    10/29/2024  Time:    17:15               Narrative:    EXAMINATION:  XR CHEST 1 VIEW    CLINICAL HISTORY:  Palpitations    TECHNIQUE:  Frontal view(s) of the chest.    COMPARISON:  No relevant comparison studies available at the time of dictation.    FINDINGS:  Normal  cardiac silhouette.  The lungs are mildly hypoinflated.  Asymmetric right hilar enlargement.  No significant pleural effusion or discernible pneumothorax.                                    EKG:     Telemetry:  NSR s/p GIA/DCCV    Physical Exam  Constitutional:       Appearance: Normal appearance.   Cardiovascular:      Rate and Rhythm: Normal rate and regular rhythm.      Pulses: Normal pulses.      Heart sounds: Normal heart sounds.   Pulmonary:      Effort: Pulmonary effort is normal.      Breath sounds: Normal breath sounds.   Musculoskeletal:      Right lower leg: No edema.      Left lower leg: No edema.   Skin:     General: Skin is warm and dry.   Neurological:      Mental Status: She is alert and oriented to person, place, and time.   Psychiatric:         Mood and Affect: Mood normal.         Behavior: Behavior normal.       Home Medications:   No current facility-administered medications on file prior to encounter.     Current Outpatient Medications on File Prior to Encounter   Medication Sig Dispense Refill    ALPRAZolam (XANAX) 0.5 MG tablet Take 0.5 mg by mouth nightly as needed for Insomnia or Anxiety.      lisinopriL-hydrochlorothiazide (PRINZIDE,ZESTORETIC) 20-12.5 mg per tablet Take 1 tablet by mouth once daily. 1 tablet daily and one tablet every evening PRN      verapamiL (CALAN-SR) 240 MG CR tablet Take 240 mg by mouth once daily.       Current Schedule Inpatient Medications:   diltiaZEM  120 mg Oral Daily    enoxparin  1 mg/kg Subcutaneous Q12H (treatment, non-standard time)     Continuous Infusions:   dilTIAZem  0-15 mg/hr Intravenous Continuous   Stopped at 10/30/24 0800     Assessment:   Paroxysmal Atrial Fibrillation     -ChadsVasc-3     -s/p Ablation > 10-15 Years ago  HTN (Controlled)  Hx PSVT s/p RFA  Hx Nephrectomy  Hx Breast CA      Plan:   S/P Successful GIA/Cardioversion today  Continue FD Lovenox with plans to Transition to PO OAC at d/c.   Re: Stroke Risk Reduction in the setting of  Afib  D/C IV Cardizem and Start Cardizem  mg PO Daily  Keep K > 4.0 and Mg > 2.0  Give 2 gm IV MGSO4 x 1 today  Continue to monitor on telemetry   Will observe over night.  Likely D/C in am if Remains NSR  Labs in AM: BMP, Mg          Thank you for your consult.     VICKIE Cruz  Cardiology  [facility] - 4th Floor Medical Telemetry  10/30/2024

## 2024-10-30 NOTE — PLAN OF CARE
Problem: Adult Inpatient Plan of Care  Goal: Plan of Care Review  Outcome: Progressing  Goal: Patient-Specific Goal (Individualized)  Outcome: Progressing  Flowsheets (Taken 10/30/2024 6432)  Individualized Care Needs: Explaining to patient  Anxieties, Fears or Concerns: Blood thinners/ new medications  Patient/Family-Specific Goals (Include Timeframe): Informative conversations about diagnosis  Goal: Absence of Hospital-Acquired Illness or Injury  Outcome: Progressing  Goal: Optimal Comfort and Wellbeing  Outcome: Progressing  Goal: Readiness for Transition of Care  Outcome: Progressing     Problem: Bariatric Environmental Safety  Goal: Safety Maintained with Care  Outcome: Progressing     Problem: Fall Injury Risk  Goal: Absence of Fall and Fall-Related Injury  Outcome: Progressing

## 2024-10-31 VITALS
TEMPERATURE: 98 F | OXYGEN SATURATION: 98 % | SYSTOLIC BLOOD PRESSURE: 148 MMHG | HEIGHT: 66 IN | RESPIRATION RATE: 18 BRPM | WEIGHT: 255.5 LBS | HEART RATE: 58 BPM | DIASTOLIC BLOOD PRESSURE: 80 MMHG | BODY MASS INDEX: 41.06 KG/M2

## 2024-10-31 PROBLEM — I48.91 ATRIAL FIBRILLATION WITH RVR: Status: ACTIVE | Noted: 2024-10-31

## 2024-10-31 PROBLEM — Z98.890 H/O CARDIAC RADIOFREQUENCY ABLATION: Status: RESOLVED | Noted: 2024-10-31 | Resolved: 2024-10-31

## 2024-10-31 PROBLEM — Z98.890 H/O CARDIAC RADIOFREQUENCY ABLATION: Status: ACTIVE | Noted: 2024-10-31

## 2024-10-31 PROBLEM — R00.2 HEART PALPITATIONS: Status: ACTIVE | Noted: 2024-10-31

## 2024-10-31 LAB
ANION GAP SERPL CALC-SCNC: 11 MEQ/L
APICAL FOUR CHAMBER EJECTION FRACTION: 68 %
APICAL TWO CHAMBER EJECTION FRACTION: 57 %
AV INDEX (PROSTH): 0.53
AV MEAN GRADIENT: 6 MMHG
AV PEAK GRADIENT: 13 MMHG
AV VALVE AREA BY VELOCITY RATIO: 1.4 CM²
AV VALVE AREA: 1.7 CM²
AV VELOCITY RATIO: 0.44
BASOPHILS # BLD AUTO: 0.1 X10(3)/MCL
BASOPHILS NFR BLD AUTO: 1.7 %
BSA FOR ECHO PROCEDURE: 2.32 M2
BUN SERPL-MCNC: 19.7 MG/DL (ref 9.8–20.1)
CALCIUM SERPL-MCNC: 9.2 MG/DL (ref 8.4–10.2)
CHLORIDE SERPL-SCNC: 107 MMOL/L (ref 98–107)
CO2 SERPL-SCNC: 22 MMOL/L (ref 23–31)
CREAT SERPL-MCNC: 0.97 MG/DL (ref 0.55–1.02)
CREAT/UREA NIT SERPL: 20
CV ECHO LV RWT: 0.76 CM
DOP CALC AO PEAK VEL: 1.8 M/S
DOP CALC AO VTI: 31.1 CM
DOP CALC LVOT AREA: 3.1 CM2
DOP CALC LVOT DIAMETER: 2 CM
DOP CALC LVOT PEAK VEL: 0.8 M/S
DOP CALC LVOT STROKE VOLUME: 51.5 CM3
DOP CALC MV VTI: 24.4 CM
DOP CALCLVOT PEAK VEL VTI: 16.4 CM
E WAVE DECELERATION TIME: 230 MSEC
E/A RATIO: 0.98
E/E' RATIO: 10.91 M/S
ECHO LV POSTERIOR WALL: 1.4 CM (ref 0.6–1.1)
EOSINOPHIL # BLD AUTO: 0.28 X10(3)/MCL (ref 0–0.9)
EOSINOPHIL NFR BLD AUTO: 4.9 %
ERYTHROCYTE [DISTWIDTH] IN BLOOD BY AUTOMATED COUNT: 13.2 % (ref 11.5–17)
FRACTIONAL SHORTENING: 21.6 % (ref 28–44)
GFR SERPLBLD CREATININE-BSD FMLA CKD-EPI: >60 ML/MIN/1.73/M2
GLUCOSE SERPL-MCNC: 112 MG/DL (ref 82–115)
HCT VFR BLD AUTO: 39.2 % (ref 37–47)
HGB BLD-MCNC: 12.8 G/DL (ref 12–16)
IMM GRANULOCYTES # BLD AUTO: 0.01 X10(3)/MCL (ref 0–0.04)
IMM GRANULOCYTES NFR BLD AUTO: 0.2 %
INTERVENTRICULAR SEPTUM: 1.1 CM (ref 0.6–1.1)
LEFT ATRIUM SIZE: 3 CM
LEFT INTERNAL DIMENSION IN SYSTOLE: 2.9 CM (ref 2.1–4)
LEFT VENTRICLE DIASTOLIC VOLUME INDEX: 26.17 ML/M2
LEFT VENTRICLE DIASTOLIC VOLUME: 58.1 ML
LEFT VENTRICLE END DIASTOLIC VOLUME APICAL 2 CHAMBER: 94.7 ML
LEFT VENTRICLE END DIASTOLIC VOLUME APICAL 4 CHAMBER: 94.2 ML
LEFT VENTRICLE MASS INDEX: 70.6 G/M2
LEFT VENTRICLE SYSTOLIC VOLUME INDEX: 14.5 ML/M2
LEFT VENTRICLE SYSTOLIC VOLUME: 32.2 ML
LEFT VENTRICULAR INTERNAL DIMENSION IN DIASTOLE: 3.7 CM (ref 3.5–6)
LEFT VENTRICULAR MASS: 156.7 G
LV LATERAL E/E' RATIO: 8.57 M/S
LV SEPTAL E/E' RATIO: 15 M/S
LVED V (TEICH): 58.1 ML
LVES V (TEICH): 32.2 ML
LVOT MG: 1 MMHG
LVOT MV: 0.51 CM/S
LYMPHOCYTES # BLD AUTO: 2.4 X10(3)/MCL (ref 0.6–4.6)
LYMPHOCYTES NFR BLD AUTO: 41.7 %
MAGNESIUM SERPL-MCNC: 2 MG/DL (ref 1.6–2.6)
MCH RBC QN AUTO: 27.6 PG (ref 27–31)
MCHC RBC AUTO-ENTMCNC: 32.7 G/DL (ref 33–36)
MCV RBC AUTO: 84.5 FL (ref 80–94)
MONOCYTES # BLD AUTO: 0.36 X10(3)/MCL (ref 0.1–1.3)
MONOCYTES NFR BLD AUTO: 6.3 %
MV MEAN GRADIENT: 2 MMHG
MV PEAK A VEL: 0.61 M/S
MV PEAK E VEL: 0.6 M/S
MV PEAK GRADIENT: 3 MMHG
MV STENOSIS PRESSURE HALF TIME: 48 MS
MV VALVE AREA BY CONTINUITY EQUATION: 2.11 CM2
MV VALVE AREA P 1/2 METHOD: 4.58 CM2
NEUTROPHILS # BLD AUTO: 2.61 X10(3)/MCL (ref 2.1–9.2)
NEUTROPHILS NFR BLD AUTO: 45.2 %
NRBC BLD AUTO-RTO: 0 %
OHS LV EJECTION FRACTION SIMPSONS BIPLANE MOD: 61 %
OHS QRS DURATION: 132 MS
OHS QRS DURATION: 142 MS
OHS QTC CALCULATION: 492 MS
OHS QTC CALCULATION: 508 MS
PISA TR MAX VEL: 1.67 M/S
PLATELET # BLD AUTO: 249 X10(3)/MCL (ref 130–400)
PMV BLD AUTO: 12.1 FL (ref 7.4–10.4)
POTASSIUM SERPL-SCNC: 4.5 MMOL/L (ref 3.5–5.1)
PV PEAK GRADIENT: 4 MMHG
PV PEAK VELOCITY: 0.96 M/S
RBC # BLD AUTO: 4.64 X10(6)/MCL (ref 4.2–5.4)
SODIUM SERPL-SCNC: 140 MMOL/L (ref 136–145)
TDI LATERAL: 0.07 M/S
TDI SEPTAL: 0.04 M/S
TDI: 0.06 M/S
TR MAX PG: 11 MMHG
TRICUSPID ANNULAR PLANE SYSTOLIC EXCURSION: 1.85 CM
WBC # BLD AUTO: 5.76 X10(3)/MCL (ref 4.5–11.5)
Z-SCORE OF LEFT VENTRICULAR DIMENSION IN END DIASTOLE: -7.4
Z-SCORE OF LEFT VENTRICULAR DIMENSION IN END SYSTOLE: -3.83

## 2024-10-31 PROCEDURE — 63600175 PHARM REV CODE 636 W HCPCS: Performed by: NURSE PRACTITIONER

## 2024-10-31 PROCEDURE — 83735 ASSAY OF MAGNESIUM: CPT | Performed by: HOSPITALIST

## 2024-10-31 PROCEDURE — 85025 COMPLETE CBC W/AUTO DIFF WBC: CPT | Performed by: HOSPITALIST

## 2024-10-31 PROCEDURE — 25000003 PHARM REV CODE 250: Performed by: NURSE PRACTITIONER

## 2024-10-31 PROCEDURE — 36415 COLL VENOUS BLD VENIPUNCTURE: CPT | Performed by: HOSPITALIST

## 2024-10-31 PROCEDURE — 80048 BASIC METABOLIC PNL TOTAL CA: CPT | Performed by: HOSPITALIST

## 2024-10-31 RX ORDER — LISINOPRIL AND HYDROCHLOROTHIAZIDE 12.5; 2 MG/1; MG/1
1 TABLET ORAL DAILY
Qty: 30 TABLET | Refills: 11 | Status: SHIPPED | OUTPATIENT
Start: 2024-10-31 | End: 2025-10-31

## 2024-10-31 RX ORDER — DILTIAZEM HYDROCHLORIDE 120 MG/1
120 CAPSULE, COATED, EXTENDED RELEASE ORAL DAILY
Qty: 30 CAPSULE | Refills: 11 | Status: SHIPPED | OUTPATIENT
Start: 2024-10-31 | End: 2025-10-31

## 2024-10-31 RX ADMIN — DILTIAZEM HYDROCHLORIDE 120 MG: 120 CAPSULE, COATED, EXTENDED RELEASE ORAL at 09:10

## 2024-10-31 RX ADMIN — ENOXAPARIN SODIUM 120 MG: 120 INJECTION SUBCUTANEOUS at 09:10

## 2024-10-31 NOTE — DISCHARGE SUMMARY
[facility] Hospital Medicine Discharge Summary    Admit Date: 10/29/2024  Discharge Date and Time: 10/31/97339:57 AM  Admitting Physician:  Team  Discharging Physician: Anatoliy Acevedo MD.  Primary Care Physician: No primary care provider on file.  Consults: Cardiology    Discharge Diagnoses:  Paroxysmal Atrial Fibrillation     -ChadsVasc-3     -s/p Ablation > 10-15 Years ago  -presenting with afib rvr > cardizem drip>ablation >nsr>doac  HTN (Controlled)  Hx PSVT s/p RFA  Hx Nephrectomy for renal ca  Hx Breast CA/ lumpectomy/rad/femara    Hospital Course:    Patient is a 69 y.o. patient known to [facility], Dr. Gold, with a PMH of PAF s/p Ablation > 10 years ago, HTN, Breast CA w lumpectomy and Rad ,renal CA post  Nephrectomy, and PSVT who presented [facility] ER on 10.29.24 with c/o palpitations over the last 24 hours associated with Mild Dyspnea.  She denied CP, N/V, fever, or diaphoresis.  Upon arrival to ER her HR was noted to be in the 110s and EKG confirmed Atrial Fibrillation with RVR.  /82 and O2 sats 97% on RA.  Laboratory values significant for BUN/Cr. 1.18 otherwise unremarkable. CXR without acute pulmonary processes. CT thorax w/o contrast w no acute findings .  She was given 10 mg IV Cardizem push and placed on continuous gtt at 2.5 mg/Hr with improvement in HR.  She was admitted to  and [facility] was consulted for evaluation and treatment of her Afib RVR.   Pt underwent successful cardioversion  by dr gold .Will cate FD lovenox and transdition to DOAC. Pt to follow up with walt MONZON as well as [facility] as scheduled .  Pt was seen and examined on the day of discharge  Vitals:  VITAL SIGNS: 24 HRS MIN & MAX LAST   Temp  Min: 97.9 °F (36.6 °C)  Max: 98.8 °F (37.1 °C) 97.9 °F (36.6 °C)   BP  Min: 113/69  Max: 154/84 (!) 148/80   Pulse  Min: 58  Max: 79  (!) 58   Resp  Min: 18  Max: 18 18   SpO2  Min: 93 %  Max: 99 % 98 %       Physical Exam:  Constitutional:       Appearance: Normal  appearance.   Cardiovascular:      Rate and Rhythm: Normal rate and regular rhythm.      Pulses: Normal pulses.      Heart sounds: Normal heart sounds.   Pulmonary:      Effort: Pulmonary effort is normal.      Breath sounds: Normal breath sounds.   Musculoskeletal:      Right lower leg: No edema.      Left lower leg: No edema.   Skin:     General: Skin is warm and dry.   Neurological:      Mental Status: She is alert and oriented to person, place, and time.   Psychiatric:         Mood and Affect: Mood normal.         Behavior: Behavior normal.     Procedures Performed: No admission procedures for hospital encounter.     Significant Diagnostic Studies: See Full reports for all details    Recent Labs   Lab 10/29/24  1628 10/30/24  0713 10/31/24  0453   WBC 8.02 5.57 5.76   RBC 5.21 4.92 4.64   HGB 14.4 13.4 12.8   HCT 43.6 41.0 39.2   MCV 83.7 83.3 84.5   MCH 27.6 27.2 27.6   MCHC 33.0 32.7* 32.7*   RDW 13.2 13.3 13.2    246 249   MPV 11.7* 12.1* 12.1*       Recent Labs   Lab 10/29/24  1628 10/30/24  0712 10/31/24  0453    137 140   K 3.6 4.0 4.5    105 107   CO2 24 23 22*   BUN 27.7* 22.1* 19.7   CREATININE 1.18* 0.94 0.97   CALCIUM 9.2 8.9 9.2   MG  --  1.80 2.00   ALBUMIN 3.7 3.4  --    ALKPHOS 75 66  --    ALT 23 20  --    AST 19 16  --    BILITOT 0.3 0.4  --         Microbiology Results (last 7 days)       ** No results found for the last 168 hours. **             Echo    Left Ventricle: The left ventricle is normal in size. Normal wall   thickness. There is normal systolic function with a visually estimated   ejection fraction of 55 - 60%.    Right Ventricle: Right ventricular enlargement. Systolic function is   normal.    Aortic Valve: The aortic valve is a trileaflet valve. There is aortic   valve sclerosis.    MEDICATIONS FOR DISCHARGE    1- ELIQUIS 5 MGS PO BID   2- CARDIZEM  MGS PO BID    Explained in detail to the patient about the discharge plan, medications, and follow-up  visits. Pt understands and agrees with the treatment plan  Discharge Disposition:   home   Discharged Condition: stable  Diet- cardiac   Dietary Orders (From admission, onward)       Start     Ordered    10/29/24 2244  Diet Adult Regular  (Diet/Nutrition - [facility])  Diet effective now         10/29/24 2244                   Medications Per DC med rec  Activities as tolerated    For further questions contact hospitalist office    Discharge time 33 minutes    For worsening symptoms, chest pain, shortness of breath, increased abdominal pain, high grade fever, stroke or stroke like symptoms, immediately go to the nearest Emergency Room or call 911 as soon as possible.      Anatoliy Stanley M.D, on 10/31/2024. at 7:57 AM.

## 2024-10-31 NOTE — DISCHARGE INSTRUCTIONS
1.-your medication has been sent to [facility] at [place] [address] in [place].  -Eliquis 5 mg twice a day  -Cardizem  mg by mouth once a day  -record blood pressure on a daily basis and keep a log    2.-follow-up with your primary care physician  3.-follow-up with [facility]/ doctor ingraldi  as scheduled by then

## 2024-10-31 NOTE — PROGRESS NOTES
[facility]    Cardiology  Consult Note    Patient Name: Alesha Maradiaga  MRN: [id number]  Admission Date: 10/29/2024  Hospital Length of Stay: 2 days  Code Status: Full Code   Attending Provider: Anatoliy Acevedo MD   Consulting Provider: VICKIE Cruz  Primary Care Physician: No primary care provider on file.  Principal Problem:<principal problem not specified>    Patient information was obtained from patient, past medical records, and ER records.     Subjective:     Chief Complaint/Reason for Consult: Atrial Fibrillation with RVR    HPI:  Patient is a 69 y.o. patient known to [facility], Dr. Hi, with a PMH of PAF s/p Ablation > 10 years ago, HTN, Breast CA, Nephrectomy, and PSVT who presented [facility] ER on 10.29.24 with c/o palpitations over the last 24 hours associated with Mild Dyspnea.  She denied CP, N/V, fever, or diaphoresis.  Upon arrival to ER her HR was noted to be in the 110s and EKG confirmed Atrial Fibrillation with RVR.  /82 and O2 sats 97% on RA.  Laboratory values significant for BUN/Cr. 1.18 otherwise unremarkable. CXR without acute pulmonary processes.  She was given 10 mg IV Cardizem push and placed on continuous gtt at 2.5 mg/Hr with improvement in HR.  She was admitted to  and [facility] was consulted for evaluation and treatment of her Afib RVR.       PMH: PAF sp Ablation > 10 years ago, HTN, PSVT, Breast CA  PSH: Nephrectomy, Cardiac Ablation, Left Breast Lumpectomy, Tonsillectomy, Appendectomy  Family History: Mother, Brother: HTN, Type II DM.  Sister: CVA, HTN, Type II DM  Social History: Former Smoker (Quit 1975)    Previous Cardiac Diagnostics:   TTE (10.30.24)    Left Ventricle: The left ventricle is normal in size. Normal wall thickness. There is normal systolic function with a visually estimated ejection fraction of 55 - 60%.    Right Ventricle: Right ventricular enlargement. Systolic function is normal.    Aortic Valve: The aortic  valve is a trileaflet valve. There is aortic valve sclerosis.    TTE (8.9.2023)  1. The study quality is average.   2. The left ventricle is normal in size. Global left ventricular systolic function is normal. The left ventricular ejection fraction is 65%. The left ventricle diastolic function is impaired (Grade I) with normal left atrial pressure. Concentric left ventricular hypertrophy is present. It is mild.  3. The left atrial diameter is mildly increased.   4. Mild (1+) tricuspid regurgitation. Trace mitral regurgitation.  5. The estimated pulmonary artery systolic pressure is 37 mmHg assuming a right atrial pressure of 3 mmHg.     PET (1.27.2020)   This is an abnormal perfusion study. Study is consistent with ischemia.    Small reversible perfusion abnormality of mild intensity in the anterior region.    Perfusion imaging is suggestive of single vessel disease. Perfusion defect is in the distribution of left anterior descending artery.    This scan is suggestive of low to moderate risk for future cardiovascular events.    The left ventricular cavity is noted to be mildly enlarged on the stress studies. The stress left ventricular ejection fraction was calculated to be 69% and left ventricular global function is normal. The rest left ventricular cavity is noted to be normal. The rest left ventricular ejection fraction was calculated to be 58% and rest left ventricular global function is normal.    The study quality is average    Cardiac Calcium Score (1.20.2020)--> 2      No past medical history on file.  No past surgical history on file.  Review of patient's allergies indicates:   Allergen Reactions    Iodinated contrast media     Penicillin Rash     No current facility-administered medications on file prior to encounter.     Current Outpatient Medications on File Prior to Encounter   Medication Sig    ALPRAZolam (XANAX) 0.5 MG tablet Take 0.5 mg by mouth nightly as needed for Insomnia or Anxiety.     [DISCONTINUED] lisinopriL-hydrochlorothiazide (PRINZIDE,ZESTORETIC) 20-12.5 mg per tablet Take 1 tablet by mouth once daily. 1 tablet daily and one tablet every evening PRN    [DISCONTINUED] verapamiL (CALAN-SR) 240 MG CR tablet Take 240 mg by mouth once daily.     Family History    None       Tobacco Use    Smoking status: Not on file    Smokeless tobacco: Not on file   Substance and Sexual Activity    Alcohol use: Not on file    Drug use: Not on file    Sexual activity: Not on file       Review of Systems   Constitutional:  Negative for appetite change and fatigue.   Respiratory:  Negative for chest tightness and shortness of breath.    Cardiovascular:  Negative for chest pain, palpitations and leg swelling.   All other systems reviewed and are negative.    Objective:     Vital Signs (Most Recent):  Temp: 97.9 °F (36.6 °C) (10/31/24 0721)  Pulse: (!) 58 (10/31/24 0721)  Resp: 18 (10/31/24 0721)  BP: (!) 148/80 (10/31/24 0721)  SpO2: 98 % (10/31/24 0721) Vital Signs (24h Range):  Temp:  [97.9 °F (36.6 °C)-98.8 °F (37.1 °C)] 97.9 °F (36.6 °C)  Pulse:  [58-79] 58  Resp:  [18] 18  SpO2:  [93 %-99 %] 98 %  BP: (113-154)/(69-84) 148/80   Weight: 115.9 kg (255 lb 8.2 oz)  Body mass index is 41.24 kg/m².  SpO2: 98 %     No intake or output data in the 24 hours ending 10/31/24 0900    Lines/Drains/Airways       Peripheral Intravenous Line  Duration                  Peripheral IV - Single Lumen 10/29/24 1622 20 G Left Antecubital 1 day                  Significant Labs:   Chemistries:   Recent Labs   Lab 10/29/24  1628 10/29/24  2323 10/30/24  0712 10/31/24  0453     --  137 140   K 3.6  --  4.0 4.5     --  105 107   CO2 24  --  23 22*   BUN 27.7*  --  22.1* 19.7   CREATININE 1.18*  --  0.94 0.97   CALCIUM 9.2  --  8.9 9.2   BILITOT 0.3  --  0.4  --    ALKPHOS 75  --  66  --    ALT 23  --  20  --    AST 19  --  16  --    GLUCOSE 110  --  110 112   MG  --   --  1.80 2.00   PHOS  --   --  3.8  --    TROPONINI  <0.010 <0.010 <0.010  --         CBC/Anemia Labs: Coags:    Recent Labs   Lab 10/29/24  1628 10/30/24  0713 10/31/24  0453   WBC 8.02 5.57 5.76   HGB 14.4 13.4 12.8   HCT 43.6 41.0 39.2    246 249   MCV 83.7 83.3 84.5   RDW 13.2 13.3 13.2    Recent Labs   Lab 10/29/24  1628   INR 1.0   APTT 27.8        Significant Imaging:  Imaging Results              CT Chest Without Contrast (Final result)  Result time 10/30/24 10:38:17      Final result by Galilea Linares MD (10/30/24 10:38:17)                   Impression:      No appreciable acute cardiopulmonary abnormality    Suspected area of fat necrosis at the upper inner right breast.  Correlate with clinical exam and 01/22/2024 screening mammogram.    No significant discrepancy from preliminary report.      Electronically signed by: Galilea Linares  Date:    10/30/2024  Time:    10:38               Narrative:    EXAMINATION:  CT CHEST WITHOUT CONTRAST    CLINICAL HISTORY:  a fib;    TECHNIQUE:  Helically acquired axial images, sagittal and coronal reformations were obtained from the thoracic inlet to the lung bases without the IV administration of contrast.    Automated tube current modulation, weight-based exposure dosing, and/or iterative reconstruction technique utilized to reach lowest reasonably achievable exposure rate.    DLP: 204 mGy*cm    COMPARISON:  PET-CT 01/31/2012    FINDINGS:  BASE OF NECK: Goiter    AORTA: Mild aortic atherosclerosis.    HEART: There are calcifications in the region the mitral valve annulus.    Cardiomegaly.    KRISTAL/MEDIASTINUM: No enlarged lymph nodes by size criteria.  Evaluation of hilar lymphadenopathy is limited without intravenous contrast.    AIRWAYS: Patent.    LUNGS: Clear lungs.    PLEURA: No pleural effusion or pneumothorax.    UPPER ABDOMEN: Liver is hypodense compatible with steatosis.    THORACIC SOFT TISSUES: Lobule of fat outlined by soft tissue density at the upper inner right breast measuring approximately  2.9 cm.    BONES: No acute fracture. No suspicious lytic or sclerotic lesions.                        Preliminary result by Home Pizarro Jr., MD (10/29/24 21:06:44)                   Impression:    1. No acute focal infiltrate or consolidation is seen.  2. No acute intrathoracic process identified. Details and other findings as discussed above.               Narrative:    START OF REPORT:  Technique: CT Scan of the chest was performed without intravenous contrast with direct axial as well as sagittal and, coronal, reconstruction images.    Dosage Information: Automated Exposure Control was utilized 204.27 mGy.cm.    Comparison: None.    Clinical History: A fib.    Findings:  Soft Tissues: Unremarkable.  Lines and Tubes: None.  Axilla: A few surgical clips are seen in the right axilla.  Neck: The right thyroid lobe is enlarged by hypodense lesions, some with peripheral calcification.  Mediastinum: A small hiatal hernia is noted.  Heart: Mild cardiomegaly is seen. Mild coronary artery calcification is seen.  Aorta: Mild aortic calcification is seen in the thoracic aorta.  Lungs: Moderate streaky opacity is seen in the lingula consistent with scarring and or subsegmental atelectasis. No acute focal infiltrate or consolidation is seen.  Pleura: No effusions or pneumothorax are identified.  Bony Structures:  Spine: Moderate spondylolytic changes are seen in the thoracic spine.  Ribs: The ribs appear unremarkable.  Abdomen: The visualized upper abdominal organs appear unremarkable.                                         X-Ray Chest 1 View (Final result)  Result time 10/29/24 17:15:54      Final result by Manish Naranjo MD (10/29/24 17:15:54)                   Impression:      Right hilar enlargement.  This may be overlapping vasculature, but lymphadenopathy is an additional consideration.  Chest CT can be pursued for further assessment.      Electronically signed by: Manish  Jose A  Date:    10/29/2024  Time:    17:15               Narrative:    EXAMINATION:  XR CHEST 1 VIEW    CLINICAL HISTORY:  Palpitations    TECHNIQUE:  Frontal view(s) of the chest.    COMPARISON:  No relevant comparison studies available at the time of dictation.    FINDINGS:  Normal cardiac silhouette.  The lungs are mildly hypoinflated.  Asymmetric right hilar enlargement.  No significant pleural effusion or discernible pneumothorax.                                    EKG:     Telemetry:  NSR s/p GIA/DCCV    Physical Exam  Constitutional:       Appearance: Normal appearance.   Cardiovascular:      Rate and Rhythm: Normal rate and regular rhythm.      Pulses: Normal pulses.      Heart sounds: Normal heart sounds.   Pulmonary:      Effort: Pulmonary effort is normal.      Breath sounds: Normal breath sounds.   Musculoskeletal:      Right lower leg: No edema.      Left lower leg: No edema.   Skin:     General: Skin is warm and dry.   Neurological:      Mental Status: She is alert and oriented to person, place, and time.   Psychiatric:         Mood and Affect: Mood normal.         Behavior: Behavior normal.       Home Medications:   No current facility-administered medications on file prior to encounter.     Current Outpatient Medications on File Prior to Encounter   Medication Sig Dispense Refill    ALPRAZolam (XANAX) 0.5 MG tablet Take 0.5 mg by mouth nightly as needed for Insomnia or Anxiety.      [DISCONTINUED] lisinopriL-hydrochlorothiazide (PRINZIDE,ZESTORETIC) 20-12.5 mg per tablet Take 1 tablet by mouth once daily. 1 tablet daily and one tablet every evening PRN      [DISCONTINUED] verapamiL (CALAN-SR) 240 MG CR tablet Take 240 mg by mouth once daily.       Current Schedule Inpatient Medications:   diltiaZEM  120 mg Oral Daily    enoxparin  1 mg/kg Subcutaneous Q12H (treatment, non-standard time)     Continuous Infusions:      Assessment:   Paroxysmal Atrial Fibrillation-->s/p Successful DCCV--> Now NSR      -ChadsVasc-3     -s/p Ablation > 10-15 Years ago  HTN (Controlled)  Hx PSVT s/p RFA  Hx Nephrectomy  Hx Breast CA      Plan:   S/P Successful GIA/Cardioversion yesterday  Echocardiogram Reviewed  Start Eliquis 5 mg PO BID.  Re: stroke risk reduction in the setting of Afib  Continue Cardizem  mg PO Daily  D/C Verapamil  Continue Lisinopril/HCTZ with close monitoring of BP  Ok to d/c from Cardiac Standpoint  F/U with Dr. Hi/MIKE Cortez NP on 11.18.2024 @ 1:00 PM          Thank you for your consult.     Kayla El, P  Cardiology  [facility] - 4th Floor Medical Telemetry  10/31/2024

## 2024-11-04 ENCOUNTER — TELEPHONE (OUTPATIENT)
Dept: ADMINISTRATIVE | Facility: CLINIC | Age: 69
End: 2024-11-04
Payer: MEDICARE

## 2024-11-04 ENCOUNTER — PATIENT OUTREACH (OUTPATIENT)
Dept: ADMINISTRATIVE | Facility: CLINIC | Age: 69
End: 2024-11-04
Payer: MEDICARE

## 2024-11-04 NOTE — PROGRESS NOTES
C3 nurse attempted to contact Alesha Maradiaga for a TCC post hospital discharge follow up call. No answer. No voicemail available. The patient has a scheduled [facility]OSFU appointment with Brayan Penn MD (PCP) on 11/6/24 @ 1:00pm (Per chart note from [facility]).

## 2024-11-05 NOTE — PROGRESS NOTES
C3 nurse spoke with Alesha Maradiaga for a TCC post hospital discharge follow up call. The patient has a scheduled [facility] appointment with Brayan Penn MD (PCP) on 11/6/24 @ 1:00pm. She will also see Margot Hi MD (cardiovascular disease) on 11/18/24 @ 1:00pm.     She did note that she will need to ask her PCP for a refill of her Xanax, and she has stopped taking the hormone replacement therapy at this time as is causes nausea, but she will discuss with Dr. Espinosa (Oncology) at her next follow up.

## 2025-08-09 ENCOUNTER — HOSPITAL ENCOUNTER (OUTPATIENT)
Facility: HOSPITAL | Age: 70
Discharge: HOME | End: 2025-08-10
Attending: INTERNAL MEDICINE
Payer: MEDICARE

## 2025-08-09 DIAGNOSIS — R06.02 SHORTNESS OF BREATH: ICD-10-CM

## 2025-08-09 DIAGNOSIS — R06.02 SOB (SHORTNESS OF BREATH): ICD-10-CM

## 2025-08-09 DIAGNOSIS — J18.9 PNEUMONIA OF LEFT LOWER LOBE DUE TO INFECTIOUS ORGANISM: ICD-10-CM

## 2025-08-09 DIAGNOSIS — I48.91 ATRIAL FIBRILLATION WITH RAPID VENTRICULAR RESPONSE: Primary | ICD-10-CM

## 2025-08-09 DIAGNOSIS — R07.9 CHEST PAIN: ICD-10-CM

## 2025-08-09 DIAGNOSIS — J90 PLEURAL EFFUSION ON LEFT: ICD-10-CM

## 2025-08-09 LAB
ALBUMIN SERPL-MCNC: 3.4 G/DL (ref 3.4–4.8)
ALBUMIN/GLOB SERPL: 1 RATIO (ref 1.1–2)
ALP SERPL-CCNC: 86 UNIT/L (ref 40–150)
ALT SERPL-CCNC: 19 UNIT/L (ref 0–55)
ANION GAP SERPL CALC-SCNC: 12 MEQ/L
AST SERPL-CCNC: 16 UNIT/L (ref 11–45)
B PERT.PT PRMT NPH QL NAA+NON-PROBE: NOT DETECTED
BASOPHILS # BLD AUTO: 0.09 X10(3)/MCL
BASOPHILS NFR BLD AUTO: 1.4 %
BILIRUB SERPL-MCNC: 0.5 MG/DL
BUN SERPL-MCNC: 18.8 MG/DL (ref 9.8–20.1)
C PNEUM DNA NPH QL NAA+NON-PROBE: NOT DETECTED
CALCIUM SERPL-MCNC: 9.3 MG/DL (ref 8.4–10.2)
CHLORIDE SERPL-SCNC: 107 MMOL/L (ref 98–107)
CO2 SERPL-SCNC: 22 MMOL/L (ref 23–31)
CREAT SERPL-MCNC: 0.85 MG/DL (ref 0.55–1.02)
CREAT/UREA NIT SERPL: 22
EOSINOPHIL # BLD AUTO: 0.25 X10(3)/MCL (ref 0–0.9)
EOSINOPHIL NFR BLD AUTO: 3.9 %
ERYTHROCYTE [DISTWIDTH] IN BLOOD BY AUTOMATED COUNT: 14.2 % (ref 11.5–17)
GFR SERPLBLD CREATININE-BSD FMLA CKD-EPI: >60 ML/MIN/1.73/M2
GLOBULIN SER-MCNC: 3.3 GM/DL (ref 2.4–3.5)
GLUCOSE SERPL-MCNC: 108 MG/DL (ref 82–115)
HADV DNA NPH QL NAA+NON-PROBE: NOT DETECTED
HCOV 229E RNA NPH QL NAA+NON-PROBE: NOT DETECTED
HCOV HKU1 RNA NPH QL NAA+NON-PROBE: NOT DETECTED
HCOV NL63 RNA NPH QL NAA+NON-PROBE: NOT DETECTED
HCOV OC43 RNA NPH QL NAA+NON-PROBE: NOT DETECTED
HCT VFR BLD AUTO: 35.5 % (ref 37–47)
HGB BLD-MCNC: 11.6 G/DL (ref 12–16)
HMPV RNA NPH QL NAA+NON-PROBE: NOT DETECTED
HPIV1 RNA NPH QL NAA+NON-PROBE: NOT DETECTED
HPIV2 RNA NPH QL NAA+NON-PROBE: NOT DETECTED
HPIV3 RNA NPH QL NAA+NON-PROBE: NOT DETECTED
HPIV4 RNA NPH QL NAA+NON-PROBE: NOT DETECTED
IMM GRANULOCYTES # BLD AUTO: 0.01 X10(3)/MCL (ref 0–0.04)
IMM GRANULOCYTES NFR BLD AUTO: 0.2 %
LYMPHOCYTES # BLD AUTO: 1.73 X10(3)/MCL (ref 0.6–4.6)
LYMPHOCYTES NFR BLD AUTO: 27.3 %
M PNEUMO DNA NPH QL NAA+NON-PROBE: NOT DETECTED
MAGNESIUM SERPL-MCNC: 1.8 MG/DL (ref 1.6–2.6)
MCH RBC QN AUTO: 27.3 PG (ref 27–31)
MCHC RBC AUTO-ENTMCNC: 32.7 G/DL (ref 33–36)
MCV RBC AUTO: 83.5 FL (ref 80–94)
MONOCYTES # BLD AUTO: 0.46 X10(3)/MCL (ref 0.1–1.3)
MONOCYTES NFR BLD AUTO: 7.3 %
NEUTROPHILS # BLD AUTO: 3.79 X10(3)/MCL (ref 2.1–9.2)
NEUTROPHILS NFR BLD AUTO: 59.9 %
NRBC BLD AUTO-RTO: 0 %
NT-PROBNP SERPL-MCNC: 2061 PG/ML
OHS QRS DURATION: 136 MS
OHS QTC CALCULATION: 529 MS
PLATELET # BLD AUTO: 320 X10(3)/MCL (ref 130–400)
PMV BLD AUTO: 11.1 FL (ref 7.4–10.4)
POTASSIUM SERPL-SCNC: 3.7 MMOL/L (ref 3.5–5.1)
PROT SERPL-MCNC: 6.7 GM/DL (ref 5.8–7.6)
RBC # BLD AUTO: 4.25 X10(6)/MCL (ref 4.2–5.4)
RSV RNA NPH QL NAA+NON-PROBE: NOT DETECTED
RV+EV RNA NPH QL NAA+NON-PROBE: NOT DETECTED
SODIUM SERPL-SCNC: 141 MMOL/L (ref 136–145)
TROPONIN I SERPL HS-MCNC: 8 NG/L
TSH SERPL-ACNC: 1.15 UIU/ML (ref 0.35–4.94)
WBC # BLD AUTO: 6.33 X10(3)/MCL (ref 4.5–11.5)

## 2025-08-09 PROCEDURE — 93005 ELECTROCARDIOGRAM TRACING: CPT

## 2025-08-09 PROCEDURE — 83735 ASSAY OF MAGNESIUM: CPT | Performed by: INTERNAL MEDICINE

## 2025-08-09 PROCEDURE — 83880 ASSAY OF NATRIURETIC PEPTIDE: CPT | Performed by: INTERNAL MEDICINE

## 2025-08-09 PROCEDURE — 63600175 PHARM REV CODE 636 W HCPCS: Performed by: INTERNAL MEDICINE

## 2025-08-09 PROCEDURE — 80053 COMPREHEN METABOLIC PANEL: CPT | Performed by: INTERNAL MEDICINE

## 2025-08-09 PROCEDURE — 25000003 PHARM REV CODE 250

## 2025-08-09 PROCEDURE — G0378 HOSPITAL OBSERVATION PER HR: HCPCS

## 2025-08-09 PROCEDURE — 84443 ASSAY THYROID STIM HORMONE: CPT | Performed by: INTERNAL MEDICINE

## 2025-08-09 PROCEDURE — 25000003 PHARM REV CODE 250: Performed by: CLINICAL NURSE SPECIALIST

## 2025-08-09 PROCEDURE — 99285 EMERGENCY DEPT VISIT HI MDM: CPT | Mod: 25

## 2025-08-09 PROCEDURE — 96365 THER/PROPH/DIAG IV INF INIT: CPT

## 2025-08-09 PROCEDURE — 25000003 PHARM REV CODE 250: Performed by: INTERNAL MEDICINE

## 2025-08-09 PROCEDURE — 84484 ASSAY OF TROPONIN QUANT: CPT | Performed by: INTERNAL MEDICINE

## 2025-08-09 PROCEDURE — 87798 DETECT AGENT NOS DNA AMP: CPT

## 2025-08-09 PROCEDURE — 93010 ELECTROCARDIOGRAM REPORT: CPT | Mod: ,,, | Performed by: INTERNAL MEDICINE

## 2025-08-09 PROCEDURE — 96375 TX/PRO/DX INJ NEW DRUG ADDON: CPT

## 2025-08-09 PROCEDURE — 85025 COMPLETE CBC W/AUTO DIFF WBC: CPT | Performed by: INTERNAL MEDICINE

## 2025-08-09 RX ORDER — DOXYCYCLINE HYCLATE 100 MG
100 TABLET ORAL EVERY 12 HOURS
Status: DISCONTINUED | OUTPATIENT
Start: 2025-08-09 | End: 2025-08-10 | Stop reason: HOSPADM

## 2025-08-09 RX ORDER — LETROZOLE 2.5 MG/1
2.5 TABLET, FILM COATED ORAL NIGHTLY
COMMUNITY

## 2025-08-09 RX ORDER — SODIUM CHLORIDE 0.9 % (FLUSH) 0.9 %
10 SYRINGE (ML) INJECTION EVERY 12 HOURS PRN
Status: DISCONTINUED | OUTPATIENT
Start: 2025-08-09 | End: 2025-08-10 | Stop reason: HOSPADM

## 2025-08-09 RX ORDER — NALOXONE HCL 0.4 MG/ML
0.02 VIAL (ML) INJECTION
Status: DISCONTINUED | OUTPATIENT
Start: 2025-08-09 | End: 2025-08-10 | Stop reason: HOSPADM

## 2025-08-09 RX ORDER — CEFTRIAXONE 1 G/1
1 INJECTION, POWDER, FOR SOLUTION INTRAMUSCULAR; INTRAVENOUS
Status: DISCONTINUED | OUTPATIENT
Start: 2025-08-10 | End: 2025-08-10 | Stop reason: HOSPADM

## 2025-08-09 RX ORDER — CEFTRIAXONE 2 G/1
2 INJECTION, POWDER, FOR SOLUTION INTRAMUSCULAR; INTRAVENOUS
Status: COMPLETED | OUTPATIENT
Start: 2025-08-09 | End: 2025-08-09

## 2025-08-09 RX ORDER — DILTIAZEM HYDROCHLORIDE 5 MG/ML
10 INJECTION INTRAVENOUS
Status: COMPLETED | OUTPATIENT
Start: 2025-08-09 | End: 2025-08-09

## 2025-08-09 RX ORDER — IBUPROFEN 200 MG
24 TABLET ORAL
Status: DISCONTINUED | OUTPATIENT
Start: 2025-08-09 | End: 2025-08-10 | Stop reason: HOSPADM

## 2025-08-09 RX ORDER — FUROSEMIDE 10 MG/ML
40 INJECTION INTRAMUSCULAR; INTRAVENOUS
Status: COMPLETED | OUTPATIENT
Start: 2025-08-09 | End: 2025-08-09

## 2025-08-09 RX ORDER — DILTIAZEM HYDROCHLORIDE 180 MG/1
180 CAPSULE, COATED, EXTENDED RELEASE ORAL DAILY
Status: DISCONTINUED | OUTPATIENT
Start: 2025-08-10 | End: 2025-08-10

## 2025-08-09 RX ORDER — ACETAMINOPHEN 325 MG/1
650 TABLET ORAL EVERY 6 HOURS PRN
Status: DISCONTINUED | OUTPATIENT
Start: 2025-08-09 | End: 2025-08-10 | Stop reason: HOSPADM

## 2025-08-09 RX ORDER — GLUCAGON 1 MG
1 KIT INJECTION
Status: DISCONTINUED | OUTPATIENT
Start: 2025-08-09 | End: 2025-08-10 | Stop reason: HOSPADM

## 2025-08-09 RX ORDER — IBUPROFEN 200 MG
16 TABLET ORAL
Status: DISCONTINUED | OUTPATIENT
Start: 2025-08-09 | End: 2025-08-10 | Stop reason: HOSPADM

## 2025-08-09 RX ADMIN — APIXABAN 5 MG: 5 TABLET, FILM COATED ORAL at 08:08

## 2025-08-09 RX ADMIN — ACETAMINOPHEN 650 MG: 325 TABLET ORAL at 09:08

## 2025-08-09 RX ADMIN — ACETAMINOPHEN 650 MG: 325 TABLET ORAL at 11:08

## 2025-08-09 RX ADMIN — FUROSEMIDE 40 MG: 10 INJECTION, SOLUTION INTRAMUSCULAR; INTRAVENOUS at 10:08

## 2025-08-09 RX ADMIN — DILTIAZEM HYDROCHLORIDE 10 MG: 5 INJECTION INTRAVENOUS at 08:08

## 2025-08-09 RX ADMIN — DOXYCYCLINE HYCLATE 100 MG: 100 TABLET, COATED ORAL at 09:08

## 2025-08-09 RX ADMIN — CEFTRIAXONE SODIUM 2 G: 2 INJECTION, POWDER, FOR SOLUTION INTRAMUSCULAR; INTRAVENOUS at 10:08

## 2025-08-09 RX ADMIN — AZITHROMYCIN 500 MG: 500 INJECTION, POWDER, LYOPHILIZED, FOR SOLUTION INTRAVENOUS at 10:08

## 2025-08-10 VITALS
RESPIRATION RATE: 18 BRPM | SYSTOLIC BLOOD PRESSURE: 138 MMHG | DIASTOLIC BLOOD PRESSURE: 63 MMHG | BODY MASS INDEX: 39.82 KG/M2 | OXYGEN SATURATION: 96 % | HEART RATE: 75 BPM | WEIGHT: 247.81 LBS | HEIGHT: 66 IN | TEMPERATURE: 98 F

## 2025-08-10 LAB
ALBUMIN SERPL-MCNC: 3.2 G/DL (ref 3.4–4.8)
ALBUMIN/GLOB SERPL: 1.1 RATIO (ref 1.1–2)
ALP SERPL-CCNC: 78 UNIT/L (ref 40–150)
ALT SERPL-CCNC: 16 UNIT/L (ref 0–55)
ANION GAP SERPL CALC-SCNC: 9 MEQ/L
AST SERPL-CCNC: 11 UNIT/L (ref 11–45)
BASOPHILS # BLD AUTO: 0.07 X10(3)/MCL
BASOPHILS NFR BLD AUTO: 1.4 %
BILIRUB SERPL-MCNC: 0.4 MG/DL
BUN SERPL-MCNC: 17.7 MG/DL (ref 9.8–20.1)
CALCIUM SERPL-MCNC: 8.8 MG/DL (ref 8.4–10.2)
CHLORIDE SERPL-SCNC: 107 MMOL/L (ref 98–107)
CO2 SERPL-SCNC: 26 MMOL/L (ref 23–31)
CREAT SERPL-MCNC: 0.91 MG/DL (ref 0.55–1.02)
CREAT/UREA NIT SERPL: 19
EOSINOPHIL # BLD AUTO: 0.32 X10(3)/MCL (ref 0–0.9)
EOSINOPHIL NFR BLD AUTO: 6.4 %
ERYTHROCYTE [DISTWIDTH] IN BLOOD BY AUTOMATED COUNT: 14.1 % (ref 11.5–17)
GFR SERPLBLD CREATININE-BSD FMLA CKD-EPI: >60 ML/MIN/1.73/M2
GLOBULIN SER-MCNC: 2.9 GM/DL (ref 2.4–3.5)
GLUCOSE SERPL-MCNC: 115 MG/DL (ref 82–115)
HCT VFR BLD AUTO: 35.6 % (ref 37–47)
HGB BLD-MCNC: 11.3 G/DL (ref 12–16)
IMM GRANULOCYTES # BLD AUTO: 0.01 X10(3)/MCL (ref 0–0.04)
IMM GRANULOCYTES NFR BLD AUTO: 0.2 %
LYMPHOCYTES # BLD AUTO: 1.76 X10(3)/MCL (ref 0.6–4.6)
LYMPHOCYTES NFR BLD AUTO: 35.4 %
MAGNESIUM SERPL-MCNC: 1.9 MG/DL (ref 1.6–2.6)
MCH RBC QN AUTO: 26.8 PG (ref 27–31)
MCHC RBC AUTO-ENTMCNC: 31.7 G/DL (ref 33–36)
MCV RBC AUTO: 84.4 FL (ref 80–94)
MONOCYTES # BLD AUTO: 0.41 X10(3)/MCL (ref 0.1–1.3)
MONOCYTES NFR BLD AUTO: 8.2 %
NEUTROPHILS # BLD AUTO: 2.4 X10(3)/MCL (ref 2.1–9.2)
NEUTROPHILS NFR BLD AUTO: 48.4 %
NRBC BLD AUTO-RTO: 0 %
PLATELET # BLD AUTO: 295 X10(3)/MCL (ref 130–400)
PMV BLD AUTO: 11.7 FL (ref 7.4–10.4)
POTASSIUM SERPL-SCNC: 3.6 MMOL/L (ref 3.5–5.1)
PROT SERPL-MCNC: 6.1 GM/DL (ref 5.8–7.6)
RBC # BLD AUTO: 4.22 X10(6)/MCL (ref 4.2–5.4)
SODIUM SERPL-SCNC: 142 MMOL/L (ref 136–145)
WBC # BLD AUTO: 4.97 X10(3)/MCL (ref 4.5–11.5)

## 2025-08-10 PROCEDURE — 83735 ASSAY OF MAGNESIUM: CPT

## 2025-08-10 PROCEDURE — 63600175 PHARM REV CODE 636 W HCPCS

## 2025-08-10 PROCEDURE — G0378 HOSPITAL OBSERVATION PER HR: HCPCS

## 2025-08-10 PROCEDURE — 25000003 PHARM REV CODE 250

## 2025-08-10 PROCEDURE — 36415 COLL VENOUS BLD VENIPUNCTURE: CPT | Performed by: CLINICAL NURSE SPECIALIST

## 2025-08-10 PROCEDURE — 80053 COMPREHEN METABOLIC PANEL: CPT | Performed by: CLINICAL NURSE SPECIALIST

## 2025-08-10 PROCEDURE — 25000003 PHARM REV CODE 250: Performed by: NURSE PRACTITIONER

## 2025-08-10 PROCEDURE — 85025 COMPLETE CBC W/AUTO DIFF WBC: CPT | Performed by: CLINICAL NURSE SPECIALIST

## 2025-08-10 RX ORDER — FUROSEMIDE 10 MG/ML
20 INJECTION INTRAMUSCULAR; INTRAVENOUS ONCE
Status: DISCONTINUED | OUTPATIENT
Start: 2025-08-10 | End: 2025-08-10

## 2025-08-10 RX ORDER — DILTIAZEM HYDROCHLORIDE 120 MG/1
120 CAPSULE, COATED, EXTENDED RELEASE ORAL 2 TIMES DAILY
Status: DISCONTINUED | OUTPATIENT
Start: 2025-08-10 | End: 2025-08-10 | Stop reason: HOSPADM

## 2025-08-10 RX ORDER — CEFPODOXIME PROXETIL 100 MG/1
100 TABLET, FILM COATED ORAL 2 TIMES DAILY
Qty: 8 TABLET | Refills: 0 | Status: SHIPPED | OUTPATIENT
Start: 2025-08-10 | End: 2025-08-14

## 2025-08-10 RX ORDER — DOXYCYCLINE HYCLATE 100 MG
100 TABLET ORAL EVERY 12 HOURS
Qty: 8 TABLET | Refills: 0 | Status: SHIPPED | OUTPATIENT
Start: 2025-08-10 | End: 2025-08-14

## 2025-08-10 RX ORDER — DILTIAZEM HYDROCHLORIDE 120 MG/1
120 CAPSULE, COATED, EXTENDED RELEASE ORAL 2 TIMES DAILY
Qty: 60 CAPSULE | Refills: 0 | Status: SHIPPED | OUTPATIENT
Start: 2025-08-10 | End: 2025-09-09

## 2025-08-10 RX ORDER — FUROSEMIDE 20 MG/1
20 TABLET ORAL DAILY
Status: COMPLETED | OUTPATIENT
Start: 2025-08-10 | End: 2025-08-10

## 2025-08-10 RX ADMIN — DOXYCYCLINE HYCLATE 100 MG: 100 TABLET, COATED ORAL at 08:08

## 2025-08-10 RX ADMIN — DILTIAZEM HYDROCHLORIDE 120 MG: 120 CAPSULE, COATED, EXTENDED RELEASE ORAL at 08:08

## 2025-08-10 RX ADMIN — FUROSEMIDE 20 MG: 20 TABLET ORAL at 10:08

## 2025-08-10 RX ADMIN — APIXABAN 5 MG: 5 TABLET, FILM COATED ORAL at 08:08

## 2025-08-12 ENCOUNTER — PATIENT OUTREACH (OUTPATIENT)
Dept: ADMINISTRATIVE | Facility: CLINIC | Age: 70
End: 2025-08-12
Payer: MEDICARE

## 2025-08-12 ENCOUNTER — PATIENT MESSAGE (OUTPATIENT)
Dept: ADMINISTRATIVE | Facility: CLINIC | Age: 70
End: 2025-08-12
Payer: MEDICARE